# Patient Record
Sex: FEMALE | Race: WHITE | NOT HISPANIC OR LATINO | Employment: OTHER | URBAN - METROPOLITAN AREA
[De-identification: names, ages, dates, MRNs, and addresses within clinical notes are randomized per-mention and may not be internally consistent; named-entity substitution may affect disease eponyms.]

---

## 2018-01-17 NOTE — RESULT NOTES
Message   DW pt on 6/9/2016 OV  Verified Results  (1) CBC/PLT/DIFF [id number] 10:19AM [facility]     Test Name Result Flag Reference   WBC COUNT 6 51 Thousand/uL  4 31-10 16   RBC COUNT 4 41 Million/uL  3 81-5 12   HEMOGLOBIN 12 1 g/dL  11 5-15 4   HEMATOCRIT 38 7 %  34 8-46  1   MCV 88 fL  82-98   MCH 27 4 pg  26 8-34 3   MCHC 31 3 g/dL L 31 4-37 4   RDW 16 0 % H 11 6-15 1   MPV 11 6 fL  8 9-12 7   PLATELET COUNT 439 Thousands/uL  149-390   nRBC AUTOMATED 0 /100 WBCs     NEUTROPHILS RELATIVE PERCENT 62 %  43-75   LYMPHOCYTES RELATIVE PERCENT 28 %  14-44   MONOCYTES RELATIVE PERCENT 8 %  4-12   EOSINOPHILS RELATIVE PERCENT 2 %  0-6   BASOPHILS RELATIVE PERCENT 0 %  0-1   NEUTROPHILS ABSOLUTE COUNT 3 97 Thousands/?L  1 85-7 62   LYMPHOCYTES ABSOLUTE COUNT 1 85 Thousands/?L  0 60-4 47   MONOCYTES ABSOLUTE COUNT 0 53 Thousand/?L  0 17-1 22   EOSINOPHILS ABSOLUTE COUNT 0 12 Thousand/?L  0 00-0 61   BASOPHILS ABSOLUTE COUNT 0 02 Thousands/?L  0 00-0 10

## 2018-06-28 ENCOUNTER — HOSPITAL ENCOUNTER (OUTPATIENT)
Facility: HOSPITAL | Age: 83
Setting detail: OBSERVATION
Discharge: HOME | End: 2018-06-29
Attending: EMERGENCY MEDICINE | Admitting: INTERNAL MEDICINE
Payer: MEDICARE

## 2018-06-28 ENCOUNTER — APPOINTMENT (EMERGENCY)
Dept: RADIOLOGY | Facility: HOSPITAL | Age: 83
End: 2018-06-28
Payer: MEDICARE

## 2018-06-28 DIAGNOSIS — R82.81 STERILE PYURIA: Primary | ICD-10-CM
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
## 2018-06-28 DIAGNOSIS — S60.222A: ICD-10-CM

## 2018-06-28 DIAGNOSIS — S05.12XA TRAUMATIC ECCHYMOSIS OF LEFT ORBITAL RIM, INITIAL ENCOUNTER: ICD-10-CM

## 2018-06-28 PROBLEM — R26.2 AMBULATORY DYSFUNCTION: Status: ACTIVE | Noted: 2017-12-18

## 2018-06-28 PROBLEM — E55.9 VITAMIN D DEFICIENCY: Status: ACTIVE | Noted: 2018-01-03

## 2018-06-28 PROBLEM — I50.32 DIASTOLIC CHF, CHRONIC (HCC): Status: ACTIVE | Noted: 2017-12-18

## 2018-06-28 PROBLEM — J96.11 CHRONIC RESPIRATORY FAILURE WITH HYPOXIA (HCC): Chronic | Status: ACTIVE | Noted: 2018-06-28

## 2018-06-28 PROBLEM — R42 VERTIGO: Status: ACTIVE | Noted: 2018-06-28

## 2018-06-28 LAB
ALBUMIN SERPL BCP-MCNC: 3.6 G/DL (ref 3.5–5)
ALP SERPL-CCNC: 72 U/L (ref 46–116)
ALT SERPL W P-5'-P-CCNC: 18 U/L (ref 12–78)
ANION GAP SERPL CALCULATED.3IONS-SCNC: 8 MMOL/L (ref 4–13)
AST SERPL W P-5'-P-CCNC: 18 U/L (ref 5–45)
ATRIAL RATE: 104 BPM
BACTERIA UR QL AUTO: ABNORMAL /HPF
BASOPHILS # BLD AUTO: 0.03 THOUSANDS/ΜL (ref 0–0.1)
BASOPHILS NFR BLD AUTO: 0 % (ref 0–1)
BILIRUB SERPL-MCNC: 0.73 MG/DL (ref 0.2–1)
BILIRUB UR QL STRIP: ABNORMAL
BUN SERPL-MCNC: 14 MG/DL (ref 5–25)
CALCIUM SERPL-MCNC: 9.3 MG/DL (ref 8.3–10.1)
CHLORIDE SERPL-SCNC: 105 MMOL/L (ref 100–108)
CLARITY UR: CLEAR
CLARITY, POC: CLEAR
CO2 SERPL-SCNC: 28 MMOL/L (ref 21–32)
COLOR UR: YELLOW
COLOR, POC: YELLOW
CREAT SERPL-MCNC: 0.7 MG/DL (ref 0.6–1.3)
EOSINOPHIL # BLD AUTO: 0 THOUSAND/ΜL (ref 0–0.61)
EOSINOPHIL NFR BLD AUTO: 0 % (ref 0–6)
ERYTHROCYTE [DISTWIDTH] IN BLOOD BY AUTOMATED COUNT: 13.5 % (ref 11.6–15.1)
GFR SERPL CREATININE-BSD FRML MDRD: 76 ML/MIN/1.73SQ M
GLUCOSE SERPL-MCNC: 101 MG/DL (ref 65–140)
GLUCOSE UR STRIP-MCNC: NEGATIVE MG/DL
HCT VFR BLD AUTO: 40 % (ref 34.8–46.1)
HGB BLD-MCNC: 12.8 G/DL (ref 11.5–15.4)
HGB UR QL STRIP.AUTO: NEGATIVE
HYALINE CASTS #/AREA URNS LPF: ABNORMAL /LPF
IMM GRANULOCYTES # BLD AUTO: 0.04 THOUSAND/UL (ref 0–0.2)
IMM GRANULOCYTES NFR BLD AUTO: 1 % (ref 0–2)
KETONES UR STRIP-MCNC: ABNORMAL MG/DL
LEUKOCYTE ESTERASE UR QL STRIP: ABNORMAL
LYMPHOCYTES # BLD AUTO: 1.1 THOUSANDS/ΜL (ref 0.6–4.47)
LYMPHOCYTES NFR BLD AUTO: 15 % (ref 14–44)
MCH RBC QN AUTO: 31.7 PG (ref 26.8–34.3)
MCHC RBC AUTO-ENTMCNC: 32 G/DL (ref 31.4–37.4)
MCV RBC AUTO: 99 FL (ref 82–98)
MONOCYTES # BLD AUTO: 0.51 THOUSAND/ΜL (ref 0.17–1.22)
MONOCYTES NFR BLD AUTO: 7 % (ref 4–12)
NEUTROPHILS # BLD AUTO: 5.69 THOUSANDS/ΜL (ref 1.85–7.62)
NEUTS SEG NFR BLD AUTO: 77 % (ref 43–75)
NITRITE UR QL STRIP: NEGATIVE
NON-SQ EPI CELLS URNS QL MICRO: ABNORMAL /HPF
NRBC BLD AUTO-RTO: 0 /100 WBCS
NT-PROBNP SERPL-MCNC: 1252 PG/ML
P AXIS: 66 DEGREES
PH UR STRIP.AUTO: 7 [PH] (ref 4.5–8)
PLATELET # BLD AUTO: 248 THOUSANDS/UL (ref 149–390)
PMV BLD AUTO: 11.3 FL (ref 8.9–12.7)
POTASSIUM SERPL-SCNC: 3.6 MMOL/L (ref 3.5–5.3)
PR INTERVAL: 160 MS
PROT SERPL-MCNC: 7.1 G/DL (ref 6.4–8.2)
PROT UR STRIP-MCNC: ABNORMAL MG/DL
QRS AXIS: 94 DEGREES
QRSD INTERVAL: 104 MS
QT INTERVAL: 352 MS
QTC INTERVAL: 462 MS
RBC # BLD AUTO: 4.04 MILLION/UL (ref 3.81–5.12)
RBC #/AREA URNS AUTO: ABNORMAL /HPF
SODIUM SERPL-SCNC: 141 MMOL/L (ref 136–145)
SP GR UR STRIP.AUTO: 1.02 (ref 1–1.03)
T WAVE AXIS: 48 DEGREES
TROPONIN I SERPL-MCNC: <0.02 NG/ML
TSH SERPL DL<=0.05 MIU/L-ACNC: 1.41 UIU/ML (ref 0.36–3.74)
UROBILINOGEN UR QL STRIP.AUTO: 1 E.U./DL
VENTRICULAR RATE: 104 BPM
WBC # BLD AUTO: 7.37 THOUSAND/UL (ref 4.31–10.16)
WBC #/AREA URNS AUTO: ABNORMAL /HPF

## 2018-06-28 PROCEDURE — 36415 COLL VENOUS BLD VENIPUNCTURE: CPT | Performed by: STUDENT IN AN ORGANIZED HEALTH CARE EDUCATION/TRAINING PROGRAM

## 2018-06-28 PROCEDURE — 94760 N-INVAS EAR/PLS OXIMETRY 1: CPT

## 2018-06-28 PROCEDURE — 81001 URINALYSIS AUTO W/SCOPE: CPT

## 2018-06-28 PROCEDURE — 99220 PR INITIAL OBSERVATION CARE/DAY 70 MINUTES: CPT | Performed by: INTERNAL MEDICINE

## 2018-06-28 PROCEDURE — 99285 EMERGENCY DEPT VISIT HI MDM: CPT

## 2018-06-28 PROCEDURE — 70450 CT HEAD/BRAIN W/O DYE: CPT

## 2018-06-28 PROCEDURE — 93005 ELECTROCARDIOGRAM TRACING: CPT

## 2018-06-28 PROCEDURE — 80053 COMPREHEN METABOLIC PANEL: CPT | Performed by: STUDENT IN AN ORGANIZED HEALTH CARE EDUCATION/TRAINING PROGRAM

## 2018-06-28 PROCEDURE — 84443 ASSAY THYROID STIM HORMONE: CPT | Performed by: STUDENT IN AN ORGANIZED HEALTH CARE EDUCATION/TRAINING PROGRAM

## 2018-06-28 PROCEDURE — 73130 X-RAY EXAM OF HAND: CPT

## 2018-06-28 PROCEDURE — 87086 URINE CULTURE/COLONY COUNT: CPT

## 2018-06-28 PROCEDURE — 72125 CT NECK SPINE W/O DYE: CPT

## 2018-06-28 PROCEDURE — 83880 ASSAY OF NATRIURETIC PEPTIDE: CPT | Performed by: STUDENT IN AN ORGANIZED HEALTH CARE EDUCATION/TRAINING PROGRAM

## 2018-06-28 PROCEDURE — 84484 ASSAY OF TROPONIN QUANT: CPT | Performed by: STUDENT IN AN ORGANIZED HEALTH CARE EDUCATION/TRAINING PROGRAM

## 2018-06-28 PROCEDURE — 85025 COMPLETE CBC W/AUTO DIFF WBC: CPT | Performed by: STUDENT IN AN ORGANIZED HEALTH CARE EDUCATION/TRAINING PROGRAM

## 2018-06-28 RX ORDER — CEPHALEXIN 500 MG/1
500 CAPSULE ORAL EVERY 12 HOURS SCHEDULED
Qty: 6 CAPSULE | Refills: 0 | Status: SHIPPED | OUTPATIENT
Start: 2018-06-28 | End: 2018-06-29 | Stop reason: HOSPADM

## 2018-06-28 RX ORDER — ATORVASTATIN CALCIUM 10 MG/1
10 TABLET, FILM COATED ORAL DAILY
COMMUNITY

## 2018-06-28 RX ORDER — AMLODIPINE BESYLATE 5 MG/1
5 TABLET ORAL DAILY
COMMUNITY
End: 2022-01-01 | Stop reason: ALTCHOICE

## 2018-06-28 RX ORDER — MAGNESIUM HYDROXIDE/ALUMINUM HYDROXICE/SIMETHICONE 120; 1200; 1200 MG/30ML; MG/30ML; MG/30ML
15 SUSPENSION ORAL EVERY 6 HOURS PRN
Status: DISCONTINUED | OUTPATIENT
Start: 2018-06-28 | End: 2018-06-29 | Stop reason: HOSPADM

## 2018-06-28 RX ORDER — FERROUS SULFATE 325(65) MG
325 TABLET ORAL 2 TIMES WEEKLY
COMMUNITY
Start: 2022-01-01

## 2018-06-28 RX ORDER — POTASSIUM CHLORIDE 750 MG/1
10 TABLET, EXTENDED RELEASE ORAL DAILY
Status: DISCONTINUED | OUTPATIENT
Start: 2018-06-29 | End: 2018-06-29 | Stop reason: HOSPADM

## 2018-06-28 RX ORDER — MELATONIN
1000 DAILY
COMMUNITY

## 2018-06-28 RX ORDER — PANTOPRAZOLE SODIUM 40 MG/1
40 TABLET, DELAYED RELEASE ORAL
Status: DISCONTINUED | OUTPATIENT
Start: 2018-06-29 | End: 2018-06-29 | Stop reason: HOSPADM

## 2018-06-28 RX ORDER — ALBUTEROL SULFATE 90 UG/1
2 AEROSOL, METERED RESPIRATORY (INHALATION) EVERY 4 HOURS PRN
Status: DISCONTINUED | OUTPATIENT
Start: 2018-06-28 | End: 2018-06-29 | Stop reason: HOSPADM

## 2018-06-28 RX ORDER — ATORVASTATIN CALCIUM 10 MG/1
10 TABLET, FILM COATED ORAL DAILY
Status: DISCONTINUED | OUTPATIENT
Start: 2018-06-29 | End: 2018-06-29 | Stop reason: HOSPADM

## 2018-06-28 RX ORDER — FUROSEMIDE 20 MG/1
20 TABLET ORAL
Status: DISCONTINUED | OUTPATIENT
Start: 2018-06-28 | End: 2018-06-29 | Stop reason: HOSPADM

## 2018-06-28 RX ORDER — ACETAMINOPHEN 325 MG/1
650 TABLET ORAL EVERY 6 HOURS PRN
Status: DISCONTINUED | OUTPATIENT
Start: 2018-06-28 | End: 2018-06-29 | Stop reason: HOSPADM

## 2018-06-28 RX ORDER — ASPIRIN 81 MG/1
81 TABLET, CHEWABLE ORAL DAILY
COMMUNITY

## 2018-06-28 RX ORDER — ONDANSETRON 2 MG/ML
4 INJECTION INTRAMUSCULAR; INTRAVENOUS EVERY 6 HOURS PRN
Status: DISCONTINUED | OUTPATIENT
Start: 2018-06-28 | End: 2018-06-29 | Stop reason: HOSPADM

## 2018-06-28 RX ORDER — OMEPRAZOLE 20 MG/1
20 CAPSULE, DELAYED RELEASE ORAL 2 TIMES DAILY
COMMUNITY
End: 2022-01-01 | Stop reason: ALTCHOICE

## 2018-06-28 RX ORDER — ALBUTEROL SULFATE 90 UG/1
2 AEROSOL, METERED RESPIRATORY (INHALATION) EVERY 6 HOURS PRN
COMMUNITY
Start: 2022-01-01

## 2018-06-28 RX ORDER — ASPIRIN 81 MG/1
81 TABLET, CHEWABLE ORAL DAILY
Status: DISCONTINUED | OUTPATIENT
Start: 2018-06-29 | End: 2018-06-29 | Stop reason: HOSPADM

## 2018-06-28 RX ORDER — AMLODIPINE BESYLATE 5 MG/1
5 TABLET ORAL DAILY
Status: DISCONTINUED | OUTPATIENT
Start: 2018-06-29 | End: 2018-06-29 | Stop reason: HOSPADM

## 2018-06-28 RX ORDER — MELATONIN
1000 DAILY
Status: DISCONTINUED | OUTPATIENT
Start: 2018-06-29 | End: 2018-06-29 | Stop reason: HOSPADM

## 2018-06-28 RX ORDER — FUROSEMIDE 20 MG/1
20 TABLET ORAL 2 TIMES DAILY PRN
COMMUNITY
End: 2022-01-01 | Stop reason: ALTCHOICE

## 2018-06-28 RX ORDER — POTASSIUM CHLORIDE 750 MG/1
10 TABLET, FILM COATED, EXTENDED RELEASE ORAL DAILY
COMMUNITY

## 2018-06-28 RX ADMIN — FLUTICASONE PROPIONATE AND SALMETEROL 1 PUFF: 50; 250 POWDER RESPIRATORY (INHALATION) at 21:19

## 2018-06-28 RX ADMIN — FUROSEMIDE 20 MG: 20 TABLET ORAL at 18:04

## 2018-06-28 NOTE — H&P
INTERNAL MEDICINE HISTORY AND PHYSICAL  [facility] SOD Team A    NAME: Mercedes Worthington  AGE: 80 y o  SEX: female  : 1926   MRN: [id number]  ENCOUNTER: [id number]    DATE: 2018  TIME: 5:04 PM    Primary Care Physician: No primary care provider on file  Admitting Provider: Raymond Lawson MD    Chief complaint: Fall    History of Present Illness     Mercedes Worthington is a 80 y o  female with a past medical history of chronic diastolic CHF, hypertension, hyperlipidemia, GERD/gastritis, PVD s/p bilat iliac artery stents on 2016, COPD with chronic hypoxic respiratory failure on 2-3 L home O2, who presents for evaluation after and unwitnessed fall that occurred at home the patient was going to the bathroom  Patient states she felt dizziness like the room was spinning and just fell  She struck her head and notes left eye ecchymosis  Patient has markedly reduced hearing and unable to answer most questions accurately and frequently requires redirection and repetition  She is accompanied by her daughter at bedside who did not witness the fall  The patient was treated in the ED, CT head and neck were negative for acute findings and patient was just about to be discharged home when she got out of bed she felt dizzy like the room was spinning for couple seconds  Reports nausea  Currently she denies any dizziness lying flat in bed  Reports no symptoms of pain besides her left eye which has bruising  Denies any chest pain, shortness of breath, dysuria, urinary frequency, urinary incontinence, abdominal pain, vomiting  The patient lives alone in senior living facility, is independent with all her ADLs  Review of Systems   Review of Systems   Constitutional: Negative for chills and fever  HENT: Positive for hearing loss  Respiratory: Negative for cough, chest tightness and shortness of breath  Cardiovascular: Negative for chest pain and leg swelling  Gastrointestinal: Positive for nausea   Negative for abdominal pain  Genitourinary: Negative for difficulty urinating, dysuria and frequency  Musculoskeletal: Positive for gait problem  Negative for arthralgias and back pain  Skin: Negative for rash  Neurological: Positive for dizziness  Hematological: Negative  Does not bruise/bleed easily  Psychiatric/Behavioral: Negative  Negative for agitation  All other systems reviewed and are negative  Past Medical History     Past Medical History:   Diagnosis Date    CHF (congestive heart failure) (HCC)     GERD (gastroesophageal reflux disease)     Hyperlipidemia     Hypertension        Past Surgical History   History reviewed  No pertinent surgical history  Social History     History   Alcohol Use No     History   Drug Use No     History   Smoking Status    Former Smoker   Smokeless Tobacco    Never Used       Family History     Family History   Problem Relation Age of Onset    Heart attack Mother     Diabetes Mother     Stroke Father        Medications Prior to Admission     Prior to Admission medications    Medication Sig Start Date End Date Taking? Authorizing Provider   albuterol (PROVENTIL HFA,VENTOLIN HFA) 90 mcg/act inhaler Inhale 2 puffs as needed for wheezing   Yes Historical Provider, MD   amLODIPine (NORVASC) 5 mg tablet Take 5 mg by mouth daily   Yes Historical Provider, MD   aspirin 81 mg chewable tablet Chew 81 mg daily   Yes Historical Provider, MD   atorvastatin (LIPITOR) 10 mg tablet Take 10 mg by mouth daily   Yes Historical Provider, MD   cholecalciferol (VITAMIN D3) 1,000 units tablet Take 1,000 Units by mouth daily   Yes Historical Provider, MD   ferrous sulfate 325 (65 Fe) mg tablet Take 325 mg by mouth takes 2-3 times a week   Yes Historical Provider, MD   fluticasone-salmeterol (ADVAIR) 250-50 mcg/dose inhaler Inhale 1 puff 2 (two) times a day Rinse mouth after use     Yes Historical Provider, MD   furosemide (LASIX) 20 mg tablet Take 20 mg by mouth 2 (two) times a day as needed   Yes Historical Provider, MD   omeprazole (PriLOSEC) 20 mg delayed release capsule Take 20 mg by mouth 2 (two) times a day   Yes Historical Provider, MD   potassium chloride (K-DUR) 10 mEq tablet Take 10 mEq by mouth daily   Yes Historical Provider, MD   sertraline (ZOLOFT) 50 mg tablet Take 50 mg by mouth daily   Yes Historical Provider, MD   cephalexin (KEFLEX) 500 mg capsule Take 1 capsule (500 mg total) by mouth every 12 (twelve) hours for 3 days 6/28/18 7/1/18  Jakob Espinoza MD       Allergies   No Known Allergies    Objective     Vitals:    06/28/18 1430 06/28/18 1600 06/28/18 1630 06/28/18 1700   BP: 142/74 136/63 133/71 142/66   BP Location:       Pulse: 98 94 92 82   Resp: (!) 24 22 22 20   Temp:       TempSrc:       SpO2: 92% 92% 94% 96%   Weight:         Body mass index is 30 62 kg/m²  No intake or output data in the 24 hours ending 06/28/18 1704  Invasive Devices     Peripheral Intravenous Line            Peripheral IV 06/28/18 Left Antecubital less than 1 day                Physical Exam  GENERAL: Awake and alert Elderly female  No acute distress   HEENT: Left periorbital ecchymosis  No other signs of trauma  No scleral icterus  Pupils are 2 mm and reactive to light, equal  EOMI B/L  NECK: Neck supple with normal range of motion  Trachea midline  No JVD  CARDIOVASCULAR: S1 and S2 are present with 2/6 systolic ejection murmur  Regular rate and rhythm  RESPIRATORY: CTA B/L, no rales, rhonci or wheezes  Mildly tachypneic baseline, on 2 L nasal cannula  Symmetrical respiratory expansion  BREAST: Deferred  ABDOMINAL: Bowel sounds present, non-tender, soft, non-distended  EXTREMITIES:  no cyanosis,  edema  ROM intact  Upper extremities nontender to palpation  /GYN: Deferred  RECTAL: Deferred  BACK: Deferred   NEUROLOGIC: Alert and orientated to person and place, dizziness is reproduced with lateral eye gaze though no nystagmus is seen    Dizziness also reproduced with turning of head  All 4 extremities are with equal motor strength  No facial droop, mentation is appropriate and speech is clear   SKIN: Skin is warm and dry  Exam as above, no visible skin tear or abrasions    PSYCHIATRIC: Normal mood and affect     Lab Results: I have personally reviewed pertinent reports  CBC:   Results from last 7 days  Lab Units 06/28/18  1208   WBC Thousand/uL 7 37   RBC Million/uL 4 04   HEMOGLOBIN g/dL 12 8   HEMATOCRIT % 40 0   MCV fL 99*   MCH pg 31 7   MCHC g/dL 32 0   RDW % 13 5   MPV fL 11 3   PLATELETS Thousands/uL 248   NRBC AUTO /100 WBCs 0   NEUTROS PCT % 77*   LYMPHS PCT % 15   MONOS PCT % 7   EOS PCT % 0   BASOS PCT % 0   NEUTROS ABS Thousands/µL 5 69   LYMPHS ABS Thousands/µL 1 10   MONOS ABS Thousand/µL 0 51   EOS ABS Thousand/µL 0 00   , Chemistry Profile:   Results from last 7 days  Lab Units 06/28/18  1208   SODIUM mmol/L 141   POTASSIUM mmol/L 3 6   CHLORIDE mmol/L 105   CO2 mmol/L 28   ANION GAP mmol/L 8   BUN mg/dL 14   CREATININE mg/dL 0 70   GLUCOSE RANDOM mg/dL 101   CALCIUM mg/dL 9 3   AST U/L 18   ALT U/L 18   ALK PHOS U/L 72   TOTAL PROTEIN g/dL 7 1   BILIRUBIN TOTAL mg/dL 0 73   EGFR ml/min/1 73sq m 76   , Cardiac Studies:   Results from last 7 days  Lab Units 06/28/18  1208   TROPONIN I ng/mL <0 02       Imaging: I have personally reviewed pertinent films in PACS  Xr Hand 3+ Views Left    Result Date: 6/28/2018  Narrative: LEFT HAND INDICATION:   fall, L thumb ecchymosis  COMPARISON:  None VIEWS:  XR HAND 3+ VW LEFT For the purposes of institution wide universal language the following terms will apply: (thumb=1st digit/finger, index finger=2nd digit/finger, long finger=3rd digit/finger, ring=4th digit/finger and small finger=5th digit/finger) FINDINGS: There is no acute fracture or dislocation  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  1st carpometacarpal joint osteoarthritis is present  No lytic or blastic lesions are seen  Soft tissues are unremarkable  Impression: No acute osseous abnormality  Workstation performed: [id number]     Ct Head Without Contrast    Result Date: 6/28/2018  Narrative: CT BRAIN - WITHOUT CONTRAST INDICATION:   fall, L orbital hematoma  COMPARISON:  None  TECHNIQUE:  CT examination of the brain was performed  In addition to axial images, coronal 2D reformatted images were created and submitted for interpretation  Radiation dose length product (DLP) for this visit:  1069 mGy-cm   This examination, like all CT scans performed in the [facility], was performed utilizing techniques to minimize radiation dose exposure, including the use of iterative reconstruction and automated exposure control  IMAGE QUALITY:  Diagnostic  FINDINGS: PARENCHYMA:  No intracranial mass, mass effect or midline shift  No CT signs of acute infarction  No acute parenchymal hemorrhage  9 mm right periventricular white matter calcification 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 cavernoma  Periventricular and centrum semiovale white matter hypodensity is a nonspecific finding likely representing chronic microangiopathy  Calcification bilateral cavernous internal carotid and distal vertebral arteries  VENTRICLES AND EXTRA-AXIAL SPACES:  Normal for the patient's age  Small cavum septum pellucidum, a normal variant  VISUALIZED ORBITS AND PARANASAL SINUSES:  Changes of bilateral lens replacements noted  Trace mucosal thickening right sphenoid and ethmoid sinuses  CALVARIUM AND EXTRACRANIAL SOFT TISSUES:  Minimal chronic opacification bilateral inferior mastoids  Otherwise unremarkable  Right parotid gland mostly absent with small anterior accessory lobe  Correlate with prior surgical history  Impression: No acute intracranial process  No skull fracture  Chronic microangiopathy  Workstation performed: [id number]     Ct Cervical Spine Without Contrast    Result Date: 6/28/2018  Narrative: CT CERVICAL SPINE - WITHOUT CONTRAST INDICATION:   fall on ASA   COMPARISON: None  TECHNIQUE:  CT examination of the cervical spine was performed without intravenous contrast   Contiguous axial images were obtained  Sagittal and coronal reconstructions were performed  Radiation dose length product (DLP) for this visit:  276 mGy-cm   This examination, like all CT scans performed in the [facility], was performed utilizing techniques to minimize radiation dose exposure, including the use of iterative reconstruction and automated exposure control  IMAGE QUALITY:  Diagnostic  FINDINGS: ALIGNMENT:  There is straightening of normal cervical lordosis  No subluxation or compression deformity  VERTEBRAL BODIES:  No fracture  C5 spina bifida occulta defect  DEGENERATIVE CHANGES:  Moderate multilevel cervical degenerative changes are noted  No critical central canal stenosis  PREVERTEBRAL AND PARASPINAL SOFT TISSUES:  No prevertebral soft tissue swelling  Bilateral carotid artery calcification  THORACIC INLET:  Biapical emphysematous disease and pleural parenchymal scarring  Impression: No cervical spine fracture or traumatic malalignment  Moderate multilevel cervical degenerative changes  Workstation performed: [id number]       Echo from 12/2017  LEFT VENTRICLE    Normal left ventricular chamber size  Normal left ventricular systolic     function  Normal regional wall motion  Mild concentric left ventricular     hypertrophy  Estimated left ventricular ejection fraction is 70%  RIGHT VENTRICLE    Enlarged right ventricular size  Normal right ventricular systolic function  LEFT ATRIUM    Moderately dilated left atrium  RIGHT ATRIUM    Moderately dilated right atrium  AORTA    The aorta is not well visualized  PERICARDIUM    Normal pericardium without effusion  VEINS    Normal size inferior vena cava with normal inspiratory collapse consistent     with normal right atrial pressures (0-5mmhg)       EKG, Pathology, and Other Studies: I have personally reviewed pertinent reports  Medications given in Emergency Department       Assessment and Plan   Principal Problem:    Ambulatory dysfunction  Active Problems:    Diastolic CHF, chronic (HCC)    Essential hypertension    Chronic obstructive pulmonary disease (HCC)    Gastroesophageal reflux disease without esophagitis    Iron deficiency anemia    PVD (peripheral vascular disease) with claudication (HCC)    Chronic respiratory failure with hypoxia (HCC)    Vertigo        1  Ambulatory dysfunction  Likely secondary to vertigo versus orthostasis  Will check orthostatic vital signs  Admit for observation overnight  Likely DC in the morning if she is feeling better  Patient to ambulate only with assistance and to rise from bed slowly  At this time would forgo aggressive workup due to patient's advanced age and wishes for conservative care and to be at home as much as reasonable  2   Dizziness/vertigo  Unclear etiology though possible BPPV as elicited with head turning and lateral gaze  Will instruct to rise from bed slowly  Probable discharge tomorrow  3   Chronic diastolic CHF  Continue home regimen of Lasix and blood pressure control    4  Chronic respiratory failure with hypoxia on 2-3 L at baseline  Continue 2 L while lying in bed in the hospital   No further intervention    5  COPD not in acute exacerbation  Continue home fluticasone, albuterol inhaler p r n     6   GERD  Protonix 40 mg while in the hospital    7  Peripheral vascular disease  Continue aspirin, s/p stent placement     8  iron deficiency anemia  H/H normal, hold iron tabs as only 2-3x weekly  F/u outpatient       Code Status: Level 3 - DNAR/DNI  VTE Pharmacologic Prophylaxis: Reason for no pharmacologic prophylaxis short stay, recent Gi bleed   VTE Mechanical Prophylaxis: sequential compression device  Admission Status: OBSERVATION    Admission Time  I spent 1 hour admitting the patient    This involved direct patient contact where I performed a full history and physical, reviewing previous records, and reviewing laboratory and other diagnostic studies      Manfred Carney, DO  Internal Medicine  PGY-1

## 2018-06-28 NOTE — PROGRESS NOTES
[facility] Senior Admission Note   Unit/Bed # -01 Encounter: [id number]  SOD Team A          Maxine Iyer 80 y o  female [id number]       Patient seen and examined  Reviewed H&P per Dr Romero Filter  Agree with the assessment and plan      Assessment/Plan: Principal Problem:    Ambulatory dysfunction  Active Problems:    Diastolic CHF, chronic (HCC)    Essential hypertension    Chronic obstructive pulmonary disease (HCC)    Gastroesophageal reflux disease without esophagitis    Iron deficiency anemia    PVD (peripheral vascular disease) with claudication (HCC)    Chronic respiratory failure with hypoxia (HCC)    Vertigo         Disposition:  OBSERVATION    Expected LOS: <2 Shira Sanchez MD

## 2018-06-29 VITALS
RESPIRATION RATE: 18 BRPM | WEIGHT: 137.5 LBS | TEMPERATURE: 98.5 F | DIASTOLIC BLOOD PRESSURE: 70 MMHG | BODY MASS INDEX: 28.86 KG/M2 | SYSTOLIC BLOOD PRESSURE: 146 MMHG | HEART RATE: 98 BPM | HEIGHT: 58 IN | OXYGEN SATURATION: 92 %

## 2018-06-29 LAB
ANION GAP SERPL CALCULATED.3IONS-SCNC: 9 MMOL/L (ref 4–13)
BACTERIA UR CULT: NORMAL
BUN SERPL-MCNC: 14 MG/DL (ref 5–25)
CALCIUM SERPL-MCNC: 8.6 MG/DL (ref 8.3–10.1)
CHLORIDE SERPL-SCNC: 105 MMOL/L (ref 100–108)
CO2 SERPL-SCNC: 27 MMOL/L (ref 21–32)
CREAT SERPL-MCNC: 0.68 MG/DL (ref 0.6–1.3)
GFR SERPL CREATININE-BSD FRML MDRD: 77 ML/MIN/1.73SQ M
GLUCOSE SERPL-MCNC: 99 MG/DL (ref 65–140)
MAGNESIUM SERPL-MCNC: 1.8 MG/DL (ref 1.6–2.6)
POTASSIUM SERPL-SCNC: 3.3 MMOL/L (ref 3.5–5.3)
SODIUM SERPL-SCNC: 141 MMOL/L (ref 136–145)

## 2018-06-29 PROCEDURE — 80048 BASIC METABOLIC PNL TOTAL CA: CPT | Performed by: INTERNAL MEDICINE

## 2018-06-29 PROCEDURE — 83735 ASSAY OF MAGNESIUM: CPT | Performed by: INTERNAL MEDICINE

## 2018-06-29 PROCEDURE — 99217 PR OBSERVATION CARE DISCHARGE MANAGEMENT: CPT | Performed by: INTERNAL MEDICINE

## 2018-06-29 RX ORDER — POTASSIUM CHLORIDE 20 MEQ/1
40 TABLET, EXTENDED RELEASE ORAL ONCE
Status: COMPLETED | OUTPATIENT
Start: 2018-06-29 | End: 2018-06-29

## 2018-06-29 RX ADMIN — PANTOPRAZOLE SODIUM 40 MG: 40 TABLET, DELAYED RELEASE ORAL at 06:51

## 2018-06-29 RX ADMIN — FLUTICASONE PROPIONATE AND SALMETEROL 1 PUFF: 50; 250 POWDER RESPIRATORY (INHALATION) at 10:09

## 2018-06-29 RX ADMIN — FUROSEMIDE 20 MG: 20 TABLET ORAL at 10:08

## 2018-06-29 RX ADMIN — VITAMIN D, TAB 1000IU (100/BT) 1000 UNITS: 25 TAB at 10:07

## 2018-06-29 RX ADMIN — POTASSIUM CHLORIDE 40 MEQ: 1500 TABLET, EXTENDED RELEASE ORAL at 10:07

## 2018-06-29 RX ADMIN — POTASSIUM CHLORIDE 10 MEQ: 750 TABLET, EXTENDED RELEASE ORAL at 10:07

## 2018-06-29 RX ADMIN — AMLODIPINE BESYLATE 5 MG: 5 TABLET ORAL at 10:07

## 2018-06-29 RX ADMIN — ATORVASTATIN CALCIUM 10 MG: 10 TABLET, FILM COATED ORAL at 10:07

## 2018-06-29 RX ADMIN — ASPIRIN 81 MG 81 MG: 81 TABLET ORAL at 10:08

## 2018-06-29 RX ADMIN — SERTRALINE HYDROCHLORIDE 50 MG: 50 TABLET ORAL at 10:08

## 2018-06-29 NOTE — RESPIRATORY THERAPY NOTE
RT Protocol Note  Scottie[place]ah[place]kin Addy 80 y o  female MRN: [id number]  Unit/Bed#: -01 Encounter: [id number]    Assessment    Principal Problem:    Ambulatory dysfunction  Active Problems:    Diastolic CHF, chronic (HCC)    Essential hypertension    Chronic obstructive pulmonary disease (HCC)    Gastroesophageal reflux disease without esophagitis    Iron deficiency anemia    PVD (peripheral vascular disease) with claudication (HCC)    Chronic respiratory failure with hypoxia (HCC)    Vertigo      Home Pulmonary Medications:  advair bid, albuterol mdi prn  Home Devices/Therapy: Home O2    Past Medical History:   Diagnosis Date    CHF (congestive heart failure) (HCC)     GERD (gastroesophageal reflux disease)     Hyperlipidemia     Hypertension      Social History     Social History    Marital status:      Spouse name: N/A    Number of children: N/A    Years of education: N/A     Social History Main Topics    Smoking status: Former Smoker    Smokeless tobacco: Never Used    Alcohol use No    Drug use: No    Sexual activity: Not Asked     Other Topics Concern    None     Social History Narrative    None       Subjective         Objective    Physical Exam:   Assessment Type: Assess only  General Appearance: Alert, Awake  Respiratory Pattern: Spontaneous, Symmetrical, Normal  Chest Assessment: Trachea midline  Bilateral Breath Sounds: Clear  Cough: None  O2 Device: NC    Vitals:  Blood pressure (!) 193/84, pulse 91, temperature 98 2 °F (36 8 °C), temperature source Oral, resp  rate 20, height 4' 10" (1 473 m), weight 62 4 kg (137 lb 8 oz), SpO2 91 %  Imaging and other studies: I have personally reviewed pertinent reports  O2 Device: NC     Plan    Respiratory Plan: Home Bronchodilator Patient pathway, Discontinue Protocol        Resp Comments: Pt admit s/p fall  She has a history of copd and uses advair bid and albuterol mdi prn at home  Pt currently experiencing no sob or distress  States her breathing feels at baseline  Lungs are clear  Will continue albuterol mdi prn per patient home therapy  No other respiratory intervention required  Will DC per protocol

## 2018-06-29 NOTE — PROGRESS NOTES
IM Residency Progress Note   Unit/Bed#: -01 Encounter: [id number]  SOD Team A      Emil Jameson 80 y o  female [id number]    Hospital Stay Days: 0      Assessment/Plan:    Principal Problem:    Ambulatory dysfunction  Active Problems:    Diastolic CHF, chronic (HCC)    Essential hypertension    Chronic obstructive pulmonary disease (HCC)    Gastroesophageal reflux disease without esophagitis    Iron deficiency anemia    PVD (peripheral vascular disease) with claudication (HCC)    Chronic respiratory failure with hypoxia (HCC)    Vertigo    1  Ambulatory dysfunction  Likely secondary to vertigo, orthostatic vital signs negative     Probable DC this afternoon, patient's daughter is off work at 4:00 p m        2   Dizziness/vertigo suspect BPPV  Patient's dizziness is elicited by turning her head and lying on her left side, the area of injury  He also provoked during extraocular movements  Due to the extremely short duration of symptoms and quick resolution with resting the head unlikely any suspicion for dangerous etiology  Will ambulate patient see how she does today and then probable discharge  this afternoon  No events on telemetry       3  Chronic diastolic CHF  Continue home regimen of Lasix and blood pressure control  Potassium repletion given extra 40 mEq K-Dur tablet     4  Chronic respiratory failure with hypoxia on 2-3 L at baseline, stable  Continue 2 L while lying in bed in the hospital   No further intervention     5  COPD not in acute exacerbation  Continue home fluticasone, albuterol inhaler p r n      6    GERD  Protonix 40 mg while in the hospital     7   Peripheral vascular disease  Continue aspirin, s/p stent placement      8  iron deficiency anemia  H/H normal, hold iron tabs as only 2-3x weekly  F/u outpatient          Disposition: plan for dc this evening       Subjective:   Patient feeling well overnight, though she notes that when she lies on her left side of her head she feels the dizziness as this is the site of injury  Dizziness described as spinning and is brief, provoked and self-resolving  She denies any chest pain, worsening dyspnea, or abdominal pain  Does report mild nausea associated with the dizziness  Vitals: Temp (24hrs), Av 1 °F (36 7 °C), Min:97 4 °F (36 3 °C), Max:98 9 °F (37 2 °C)  Current: Temperature: 98 °F (36 7 °C)  Vitals:    18 1907 18 2041 18 2338 18 0741   BP: (!) 193/84  145/76 165/77   BP Location: Right arm  Right arm Left arm   Pulse:   97 92   Resp:   18 18   Temp:   98 9 °F (37 2 °C) 98 °F (36 7 °C)   TempSrc:   Oral Oral   SpO2:  91% 96% 92%   Weight:       Height:        Body mass index is 28 74 kg/m²  I/O last 24 hours: In: 180 [P O :180]  Out: -       Physical Exam:  General:  Awake and alert elderly female in no acute distress, well-appearing  Head:  Left periorbital ecchymosis noted, no other injury  ENT:  Mucous membranes moist, nasal cannula noted  Neck:  Supple, no JVD  Lungs:  Clear to auscultation bilaterally without significant wheeze, rhonchi or rales, nonlabored respirations  Cardiac:  Regular rate rhythm with a 2/6 systolic ejection murmur  Abdomen is soft and nontender  Neuro:  Dizziness provoked with lateral gaze to the left, no pathologic nystagmus present, no focal motor deficits, speech is clear and mentation appropriate    Skin is warm and dry, exam as above    Invasive Devices     Peripheral Intravenous Line            Peripheral IV 18 Left Antecubital 1 day                          Labs:   Recent Results (from the past 24 hour(s))   ECG 12 lead    Collection Time: 18 10:24 AM   Result Value Ref Range    Ventricular Rate 104 BPM    Atrial Rate 104 BPM    UT Interval 160 ms    QRSD Interval 104 ms    QT Interval 352 ms    QTC Interval 462 ms    P Axis 66 degrees    QRS Axis 94 degrees    T Wave Axis 48 degrees   CBC and differential    Collection Time: 18 12:08 PM   Result Value Ref Range    WBC 7 37 4 31 - 10 16 Thousand/uL    RBC 4 04 3 81 - 5 12 Million/uL    Hemoglobin 12 8 11 5 - 15 4 g/dL    Hematocrit 40 0 34 8 - 46 1 %    MCV 99 (H) 82 - 98 fL    MCH 31 7 26 8 - 34 3 pg    MCHC 32 0 31 4 - 37 4 g/dL    RDW 13 5 11 6 - 15 1 %    MPV 11 3 8 9 - 12 7 fL    Platelets 026 705 - 549 Thousands/uL    nRBC 0 /100 WBCs    Neutrophils Relative 77 (H) 43 - 75 %    Immat GRANS % 1 0 - 2 %    Lymphocytes Relative 15 14 - 44 %    Monocytes Relative 7 4 - 12 %    Eosinophils Relative 0 0 - 6 %    Basophils Relative 0 0 - 1 %    Neutrophils Absolute 5 69 1 85 - 7 62 Thousands/µL    Immature Grans Absolute 0 04 0 00 - 0 20 Thousand/uL    Lymphocytes Absolute 1 10 0 60 - 4 47 Thousands/µL    Monocytes Absolute 0 51 0 17 - 1 22 Thousand/µL    Eosinophils Absolute 0 00 0 00 - 0 61 Thousand/µL    Basophils Absolute 0 03 0 00 - 0 10 Thousands/µL   Comprehensive metabolic panel    Collection Time: 06/28/18 12:08 PM   Result Value Ref Range    Sodium 141 136 - 145 mmol/L    Potassium 3 6 3 5 - 5 3 mmol/L    Chloride 105 100 - 108 mmol/L    CO2 28 21 - 32 mmol/L    Anion Gap 8 4 - 13 mmol/L    BUN 14 5 - 25 mg/dL    Creatinine 0 70 0 60 - 1 30 mg/dL    Glucose 101 65 - 140 mg/dL    Calcium 9 3 8 3 - 10 1 mg/dL    AST 18 5 - 45 U/L    ALT 18 12 - 78 U/L    Alkaline Phosphatase 72 46 - 116 U/L    Total Protein 7 1 6 4 - 8 2 g/dL    Albumin 3 6 3 5 - 5 0 g/dL    Total Bilirubin 0 73 0 20 - 1 00 mg/dL    eGFR 76 ml/min/1 73sq m   TSH    Collection Time: 06/28/18 12:08 PM   Result Value Ref Range    TSH 3RD GENERATON 1 410 0 358 - 3 740 uIU/mL   Troponin I    Collection Time: 06/28/18 12:08 PM   Result Value Ref Range    Troponin I <0 02 <=0 04 ng/mL   B-type natriuretic peptide    Collection Time: 06/28/18 12:08 PM   Result Value Ref Range    NT-proBNP 1,252 (H) <450 pg/mL   POCT urinalysis dipstick    Collection Time: 06/28/18  1:10 PM   Result Value Ref Range    Color, UA yellow     Clarity, UA clear ED Urine Macroscopic    Collection Time: 06/28/18  1:18 PM   Result Value Ref Range    Color, UA Yellow     Clarity, UA Clear     pH, UA 7 0 4 5 - 8 0    Leukocytes, UA Large (A) Negative    Nitrite, UA Negative Negative    Protein,  (2+) (A) Negative mg/dl    Glucose, UA Negative Negative mg/dl    Ketones, UA 40 (2+) (A) Negative mg/dl    Urobilinogen, UA 1 0 0 2, 1 0 E U /dl E U /dl    Bilirubin, UA Interference- unable to analyze (A) Negative    Blood, UA Negative Negative    Specific Gravity, UA 1 020 1 003 - 1 030   Urine Microscopic    Collection Time: 06/28/18  1:18 PM   Result Value Ref Range    RBC, UA None Seen None Seen, 0-5 /hpf    WBC, UA 20-30 (A) None Seen, 0-5, 5-55, 5-65 /hpf    Epithelial Cells None Seen None Seen, Occasional /hpf    Bacteria, UA None Seen None Seen, Occasional /hpf    Hyaline Casts, UA None Seen None Seen /lpf   Basic metabolic panel    Collection Time: 06/29/18  5:56 AM   Result Value Ref Range    Sodium 141 136 - 145 mmol/L    Potassium 3 3 (L) 3 5 - 5 3 mmol/L    Chloride 105 100 - 108 mmol/L    CO2 27 21 - 32 mmol/L    Anion Gap 9 4 - 13 mmol/L    BUN 14 5 - 25 mg/dL    Creatinine 0 68 0 60 - 1 30 mg/dL    Glucose 99 65 - 140 mg/dL    Calcium 8 6 8 3 - 10 1 mg/dL    eGFR 77 ml/min/1 73sq m   Magnesium    Collection Time: 06/29/18  5:56 AM   Result Value Ref Range    Magnesium 1 8 1 6 - 2 6 mg/dL       Radiology Results: I have personally reviewed pertinent reports  Other Diagnostic Testing:   I have personally reviewed pertinent reports          Active Meds:   Current Facility-Administered Medications   Medication Dose Route Frequency    acetaminophen (TYLENOL) tablet 650 mg  650 mg Oral Q6H PRN    albuterol (PROVENTIL HFA,VENTOLIN HFA) inhaler 2 puff  2 puff Inhalation Q4H PRN    aluminum-magnesium hydroxide-simethicone (MYLANTA) 200-200-20 mg/5 mL oral suspension 15 mL  15 mL Oral Q6H PRN    amLODIPine (NORVASC) tablet 5 mg  5 mg Oral Daily    aspirin chewable tablet 81 mg  81 mg Oral Daily    atorvastatin (LIPITOR) tablet 10 mg  10 mg Oral Daily    cholecalciferol (VITAMIN D3) tablet 1,000 Units  1,000 Units Oral Daily    fluticasone-salmeterol (ADVAIR) 250-50 mcg/dose inhaler 1 puff  1 puff Inhalation Q12H [facility]    furosemide (LASIX) tablet 20 mg  20 mg Oral BID (diuretic)    ondansetron (ZOFRAN) injection 4 mg  4 mg Intravenous Q6H PRN    pantoprazole (PROTONIX) EC tablet 40 mg  40 mg Oral Early Morning    potassium chloride (K-DUR,KLOR-CON) CR tablet 10 mEq  10 mEq Oral Daily    potassium chloride (K-DUR,KLOR-CON) CR tablet 40 mEq  40 mEq Oral Once    sertraline (ZOLOFT) tablet 50 mg  50 mg Oral Daily         VTE Pharmacologic Prophylaxis: Sequential compression device (Venodyne)   VTE Mechanical Prophylaxis: sequential compression device    Lazarus Gang, DO

## 2018-06-29 NOTE — ED PROVIDER NOTES
History  Chief Complaint   Patient presents with    Fall     Pt was in the [facility] when she became dizzy  Pt fell and struck her walker on the way down  Noted contusion to the L eye  Pt denies head/neck pain  No LOC  This is a 80-year-old female with a past medical history diastolic heart failure presents to the emergency department after a fall at home was unwitnessed  Patient states that she was going to the bathroom when she stood up from the toilet she started to feel dizzy as if the room is spinning around her  She reached out to grab her walker and but the brakes on but even with doing so she fell forward and she believes she struck her face on her walker  Patient denies any loss of consciousness  Patient also denying any headaches at this time and states the only thing that is bothering her is pain around the left orbit  She denies any palpitations prior to fall  She has not started or stopped any medications recently and she is only on a baby aspirin daily terms any anticoagulation or anti-platelet medications  Prior to Admission Medications   Prescriptions Last Dose Informant Patient Reported? Taking? albuterol (PROVENTIL HFA,VENTOLIN HFA) 90 mcg/act inhaler   Yes Yes   Sig: Inhale 2 puffs as needed for wheezing   amLODIPine (NORVASC) 5 mg tablet   Yes Yes   Sig: Take 5 mg by mouth daily   aspirin 81 mg chewable tablet   Yes Yes   Sig: Chew 81 mg daily   atorvastatin (LIPITOR) 10 mg tablet   Yes Yes   Sig: Take 10 mg by mouth daily   cholecalciferol (VITAMIN D3) 1,000 units tablet   Yes Yes   Sig: Take 1,000 Units by mouth daily   ferrous sulfate 325 (65 Fe) mg tablet   Yes Yes   Sig: Take 325 mg by mouth takes 2-3 times a week   fluticasone-salmeterol (ADVAIR) 250-50 mcg/dose inhaler   Yes Yes   Sig: Inhale 1 puff 2 (two) times a day Rinse mouth after use     furosemide (LASIX) 20 mg tablet   Yes Yes   Sig: Take 20 mg by mouth 2 (two) times a day as needed   omeprazole (PriLOSEC) 20 mg delayed release capsule   Yes Yes   Sig: Take 20 mg by mouth 2 (two) times a day   potassium chloride (K-DUR) 10 mEq tablet   Yes Yes   Sig: Take 10 mEq by mouth daily   sertraline (ZOLOFT) 50 mg tablet   Yes Yes   Sig: Take 50 mg by mouth daily      Facility-Administered Medications: None       Past Medical History:   Diagnosis Date    CHF (congestive heart failure) (HCC)     GERD (gastroesophageal reflux disease)     Hyperlipidemia     Hypertension        History reviewed  No pertinent surgical history  Family History   Problem Relation Age of Onset    Heart attack Mother     Diabetes Mother     Stroke Father      I have reviewed and agree with the history as documented  Social History   Substance Use Topics    Smoking status: Former Smoker    Smokeless tobacco: Never Used    Alcohol use No        Review of Systems   Constitutional: Negative for chills, fatigue and fever  HENT: Negative for congestion, rhinorrhea, sinus pressure and sore throat  Eyes: Negative for visual disturbance  Respiratory: Negative for cough and shortness of breath  Cardiovascular: Negative for chest pain  Gastrointestinal: Negative for abdominal pain, constipation, diarrhea, nausea and vomiting  Genitourinary: Negative for dysuria, frequency, hematuria and urgency  Musculoskeletal: Negative for arthralgias and myalgias  Skin: Negative for color change and rash  Neurological: Positive for dizziness and light-headedness  Negative for tremors, syncope, weakness, numbness and headaches         Physical Exam  ED Triage Vitals   Temperature Pulse Respirations Blood Pressure SpO2   06/28/18 1019 06/28/18 1019 06/28/18 1019 06/28/18 1019 06/28/18 1019   (!) 97 4 °F (36 3 °C) (!) 114 (!) 24 (!) 214/97 (!) 88 %      Temp Source Heart Rate Source Patient Position - Orthostatic VS BP Location FiO2 (%)   06/28/18 1019 06/28/18 1905 06/28/18 1019 06/28/18 1019 --   Oral Monitor Lying Right arm       Pain Score 06/28/18 1019       No Pain           Orthostatic Vital Signs  Vitals:    06/28/18 1907 06/28/18 2338 06/29/18 0741 06/29/18 1500   BP: (!) 193/84 145/76 165/77 146/70   Pulse:  97 92 98   Patient Position - Orthostatic VS: Standing - Orthostatic VS Lying Lying - Orthostatic VS Lying       Physical Exam   Constitutional: She is oriented to person, place, and time  She appears well-developed and well-nourished  No distress  HENT:   Head: Normocephalic and atraumatic  Eyes: Conjunctivae are normal  Pupils are equal, round, and reactive to light  Cardiovascular: Regular rhythm and normal heart sounds  Exam reveals no gallop and no friction rub  No murmur heard  Pulmonary/Chest: Breath sounds normal  No respiratory distress  She has no wheezes  She has no rales  Abdominal: Soft  Bowel sounds are normal  She exhibits no distension  There is no tenderness  There is no guarding  Musculoskeletal: Normal range of motion  She exhibits no edema, tenderness or deformity  Neurological: She is alert and oriented to person, place, and time  No cranial nerve deficit or sensory deficit  She exhibits normal muscle tone  Skin: Skin is warm and dry  No rash noted  She is not diaphoretic  No erythema  No pallor  Ecchymosis surrounding patient's left orbit   Psychiatric: She has a normal mood and affect  Her behavior is normal    Nursing note and vitals reviewed        ED Medications  Medications   potassium chloride (K-DUR,KLOR-CON) CR tablet 40 mEq (40 mEq Oral Given 6/29/18 1007)       Diagnostic Studies  Results Reviewed     Procedure Component Value Units Date/Time    Urine culture [[id number]] Collected:  06/28/18 1318    Lab Status:  Final result Specimen:  Urine from Urine, Clean Catch Updated:  06/29/18 1238     Urine Culture 40,000-49,000 cfu/ml     Urine Microscopic [[id number]]  (Abnormal) Collected:  06/28/18 1318    Lab Status:  Final result Specimen:  Urine from Urine, Clean Catch Updated:  06/28/18 5825 RBC, UA None Seen /hpf      WBC, UA 20-30 (A) /hpf      Epithelial Cells None Seen /hpf      Bacteria, UA None Seen /hpf      Hyaline Casts, UA None Seen /lpf     POCT urinalysis dipstick [[id number]]  (Normal) Resulted:  06/28/18 1310    Lab Status:  Final result Specimen:  Urine Updated:  06/28/18 1311     Color, UA yellow     Clarity, UA clear    ED Urine Macroscopic [[id number]]  (Abnormal) Collected:  06/28/18 1318    Lab Status:  Final result Specimen:  Urine Updated:  06/28/18 1310     Color, UA Yellow     Clarity, UA Clear     pH, UA 7 0     Leukocytes, UA Large (A)     Nitrite, UA Negative     Protein,  (2+) (A) mg/dl      Glucose, UA Negative mg/dl      Ketones, UA 40 (2+) (A) mg/dl      Urobilinogen, UA 1 0 E U /dl      Bilirubin, UA Interference- unable to analyze (A)     Blood, UA Negative     Specific Gravity, UA 1 020    Narrative:       CLINITEK RESULT    Comprehensive metabolic panel [[id number]] Collected:  06/28/18 1208    Lab Status:  Final result Specimen:  Blood from Arm, Left Updated:  06/28/18 1253     Sodium 141 mmol/L      Potassium 3 6 mmol/L      Chloride 105 mmol/L      CO2 28 mmol/L      Anion Gap 8 mmol/L      BUN 14 mg/dL      Creatinine 0 70 mg/dL      Glucose 101 mg/dL      Calcium 9 3 mg/dL      AST 18 U/L      ALT 18 U/L      Alkaline Phosphatase 72 U/L      Total Protein 7 1 g/dL      Albumin 3 6 g/dL      Total Bilirubin 0 73 mg/dL      eGFR 76 ml/min/1 73sq m     Narrative:         National Kidney Disease Education Program recommendations are as follows:  GFR calculation is accurate only with a steady state creatinine  Chronic Kidney disease less than 60 ml/min/1 73 sq  meters  Kidney failure less than 15 ml/min/1 73 sq  meters      TSH [[id number]]  (Normal) Collected:  06/28/18 1208    Lab Status:  Final result Specimen:  Blood from Arm, Left Updated:  06/28/18 1253     TSH 3RD GENERATON 1 410 uIU/mL     Narrative:         Patients undergoing fluorescein dye angiography may retain small amounts of fluorescein in the body for 48-72 hours post procedure  Samples containing fluorescein can produce falsely depressed TSH values  If the patient had this procedure,a specimen should be resubmitted post fluorescein clearance  The recommended reference ranges for TSH during pregnancy are as follows:  First trimester 0 1 to 2 5 uIU/mL  Second trimester  0 2 to 3 0 uIU/mL  Third trimester 0 3 to 3 0 uIU/m      B-type natriuretic peptide [[id number]]  (Abnormal) Collected:  06/28/18 1208    Lab Status:  Final result Specimen:  Blood from Arm, Left Updated:  06/28/18 1253     NT-proBNP 1,252 (H) pg/mL     Troponin I [[id number]]  (Normal) Collected:  06/28/18 1208    Lab Status:  Final result Specimen:  Blood from Arm, Left Updated:  06/28/18 1244     Troponin I <0 02 ng/mL     CBC and differential [[id number]]  (Abnormal) Collected:  06/28/18 1208    Lab Status:  Final result Specimen:  Blood from Arm, Left Updated:  06/28/18 1227     WBC 7 37 Thousand/uL      RBC 4 04 Million/uL      Hemoglobin 12 8 g/dL      Hematocrit 40 0 %      MCV 99 (H) fL      MCH 31 7 pg      MCHC 32 0 g/dL      RDW 13 5 %      MPV 11 3 fL      Platelets 307 Thousands/uL      nRBC 0 /100 WBCs      Neutrophils Relative 77 (H) %      Immat GRANS % 1 %      Lymphocytes Relative 15 %      Monocytes Relative 7 %      Eosinophils Relative 0 %      Basophils Relative 0 %      Neutrophils Absolute 5 69 Thousands/µL      Immature Grans Absolute 0 04 Thousand/uL      Lymphocytes Absolute 1 10 Thousands/µL      Monocytes Absolute 0 51 Thousand/µL      Eosinophils Absolute 0 00 Thousand/µL      Basophils Absolute 0 03 Thousands/µL                  XR hand 3+ views LEFT   Final Result by Luciana Braga MD (06/28 1307)      No acute osseous abnormality              Workstation performed: [id number]         CT cervical spine without contrast   Final Result by Juan Sampson MD (06/28 1229)      No cervical spine fracture or traumatic malalignment  Moderate multilevel cervical degenerative changes  Workstation performed: [id number]         CT head without contrast   Final Result by Maxine Mcnair MD (06/28 1224)      No acute intracranial process  No skull fracture  Chronic microangiopathy  Workstation performed: [id number]               Procedures  ECG 12 Lead Documentation  Date/Time: 6/30/2018 11:28 PM  Performed by: Eleazar Us  Authorized by: Eleazar Us     Indications / Diagnosis:  Tachycardic  ECG reviewed by me, the ED Provider: yes    Interpretation:     Interpretation: abnormal    Rate:     ECG rate assessment: tachycardic    Rhythm:     Rhythm: sinus rhythm    Ectopy:     Ectopy: none    QRS:     QRS axis:  Normal    QRS intervals:  Normal  Conduction:     Conduction: abnormal      Abnormal conduction: incomplete RBBB    ST segments:     ST segments:  Normal  T waves:     T waves: normal              Phone Consults  ED Phone Contact    ED Course           Identification of Seniors at Risk      Most Recent Value   (ISAR) Identification of Seniors at Risk   Before the illness or injury that brought you to the Emergency, did you need someone to help you on a regular basis? 0 Filed at: 06/28/2018 1024   In the last 24 hours, have you needed more help than usual?  0 Filed at: 06/28/2018 1024   Have you been hospitalized for one or more nights during the past 6 months? 1 Filed at: 06/28/2018 1024   In general, do you see well?  0 Filed at: 06/28/2018 1024   In general, do you have serious problems with your memory? 0 Filed at: 06/28/2018 1024   Do you take more than three different medications every day?   1 Filed at: 06/28/2018 1024   ISAR Score  2 Filed at: 06/28/2018 1024                          MDM  Number of Diagnoses or Management Options  Fall, initial encounter:   Sterile pyuria:   Traumatic ecchymosis of hand, left, initial encounter: Traumatic ecchymosis of left orbital rim, initial encounter:   Diagnosis management comments: Patient states that she was feeling better after lying down in the bed for a prolonged period here in the ER  Labs and images were unremarkable  However, when the patient was stood up from the bed to try and walk around the room, she states that she became very dizzy and would have fallen had we not been holding onto her    Called and spoke SOD who agreed to admit the patient to their service  CritCare Time    Disposition  Final diagnoses:   Sterile pyuria   Fall, initial encounter   Traumatic ecchymosis of left orbital rim, initial encounter   Traumatic ecchymosis of hand, left, initial encounter     Time reflects when diagnosis was documented in both MDM as applicable and the Disposition within this note     Time User Action Codes Description Comment    6/28/2018  2:40 PM Terence Silver Add [N39 0] Sterile pyuria     6/28/2018  2:41 PM Kathlyne Bride Add Henry Camel  DHUD] Fall, initial encounter     6/28/2018  2:41 PM Kathlyne Bride Add [Z18 [id number]] Traumatic ecchymosis of left orbital rim, initial encounter     6/28/2018  2:41 PM Kathlyne Bride Add [X32 [id number]] Traumatic ecchymosis of hand, left, initial encounter       ED Disposition     ED Disposition Condition Comment    Admit  Case was discussed with SOD and the patient's admission status was agreed to be Admission Status: observation status to the service of Dr David Thomson           Follow-up Information     Follow up With Specialties Details Why Contact Info    [employer]  Follow up  [phone]      Rico Blount MD Family Medicine Call in 2 week(s) Follow up with your PCP  [address] [place] [id number] [phone]            Discharge Medication List as of 6/29/2018  3:26 PM      START taking these medications    Details   cephalexin (KEFLEX) 500 mg capsule Take 1 capsule (500 mg total) by mouth every 12 (twelve) hours for 3 days, Starting Thu 6/28/2018, Until Sun 7/1/2018, Print         CONTINUE these medications which have NOT CHANGED    Details   albuterol (PROVENTIL HFA,VENTOLIN HFA) 90 mcg/act inhaler Inhale 2 puffs as needed for wheezing, Historical Med      amLODIPine (NORVASC) 5 mg tablet Take 5 mg by mouth daily, Historical Med      aspirin 81 mg chewable tablet Chew 81 mg daily, Historical Med      atorvastatin (LIPITOR) 10 mg tablet Take 10 mg by mouth daily, Historical Med      cholecalciferol (VITAMIN D3) 1,000 units tablet Take 1,000 Units by mouth daily, Historical Med      ferrous sulfate 325 (65 Fe) mg tablet Take 325 mg by mouth takes 2-3 times a week, Historical Med      fluticasone-salmeterol (ADVAIR) 250-50 mcg/dose inhaler Inhale 1 puff 2 (two) times a day Rinse mouth after use , Historical Med      furosemide (LASIX) 20 mg tablet Take 20 mg by mouth 2 (two) times a day as needed, Historical Med      omeprazole (PriLOSEC) 20 mg delayed release capsule Take 20 mg by mouth 2 (two) times a day, Historical Med      potassium chloride (K-DUR) 10 mEq tablet Take 10 mEq by mouth daily, Historical Med      sertraline (ZOLOFT) 50 mg tablet Take 50 mg by mouth daily, Historical Med             Outpatient Discharge Orders  Discharge Diet     No strenuous exercise     No dressing needed         ED Provider  Attending physically available and evaluated Odell Balderas I managed the patient along with the ED Attending      Electronically Signed by         Mejia Sharpe MD  06/30/18 8465       Mejia Sharpe MD  07/01/18 0010

## 2018-06-29 NOTE — CASE MANAGEMENT
Initial Clinical Review    Admission: Date/Time/Statement: 6/28/18 AT 1606 OBSERVATION    Orders Placed This Encounter   Procedures    Place in Observation (expected length of stay for this patient is less than two midnights)     Standing Status:   Standing     Number of Occurrences:   1     Order Specific Question:   Admitting Physician     Answer:   BRIAN SQUIRES [[id number]]     Order Specific Question:   Level of Care     Answer:   Med Surg [[id number]]       ED: Date/Time/Mode of Arrival:   ED Arrival Information     Expected Arrival Acuity Means of Arrival Escorted By Service Admission Type    - 6/28/2018 10:15 Urgent Ambulance [facility] ED of [address] [facility] Urgent    Arrival Complaint    Fall          Chief Complaint:   Chief Complaint   Patient presents with    Fall     Pt was in the [facility] when she became dizzy  Pt fell and struck her walker on the way down  Noted contusion to the L eye  Pt denies head/neck pain  No LOC  Chief complaint:   FALL       History of Present Illness      Theodore Fitzgerald is a 80 y o  female with a past medical history of chronic diastolic CHF, hypertension, hyperlipidemia, GERD/gastritis, PVD s/p bilat iliac artery stents on 11/2016, COPD with chronic hypoxic respiratory failure on 2-3 L home O2, who presents for evaluation after and unwitnessed fall that occurred at home the patient was going to the bathroom  Patient states she felt dizziness like the room was spinning and just fell  She struck her head and notes left eye ecchymosis  Patient has markedly reduced hearing and unable to answer most questions accurately and frequently requires redirection and repetition  She is accompanied by her daughter at bedside who did not witness the fall  The patient was treated in the ED, CT head and neck were negative for acute findings and patient was just about to be discharged home when she got out of bed she felt dizzy like the room was spinning for couple seconds  Reports nausea  Currently she denies any dizziness lying flat in bed  Reports no symptoms of pain besides her left eye which has bruising  Denies any chest pain, shortness of breath, dysuria, urinary frequency, urinary incontinence, abdominal pain, vomiting  The patient lives alone in senior living facility, is independent with all her ADLs     Review of Systems     Constitutional: Negative for chills and fever  HENT: Positive for hearing loss  Respiratory: Negative for cough, chest tightness and shortness of breath  Cardiovascular: Negative for chest pain and leg swelling  Gastrointestinal: Positive for nausea  Negative for abdominal pain  Genitourinary: Negative for difficulty urinating, dysuria and frequency  Musculoskeletal: Positive for gait problem  Negative for arthralgias and back pain  Skin: Negative for rash  Neurological: Positive for dizziness  Psychiatric/Behavioral: Negative  Negative for agitation  Physical Exam  GENERAL: Awake and alert Elderly female  No acute distress   HEENT: Left periorbital ecchymosis  No other signs of trauma  No scleral icterus  Pupils are 2 mm and reactive to light, equal  EOMI B/L  NECK: Neck supple with normal range of motion  Trachea midline  No JVD  CARDIOVASCULAR: S1 and S2 are present with 2/6 systolic ejection murmur  Regular rate and rhythm  RESPIRATORY: CTA B/L, no rales, rhonci or wheezes  Mildly tachypneic baseline, on 2 L nasal cannula  Symmetrical respiratory expansion  BREAST: Deferred  ABDOMINAL: Bowel sounds present, non-tender, soft, non-distended  EXTREMITIES:  no cyanosis,  edema  ROM intact  Upper extremities nontender to palpation  /GYN: Deferred  RECTAL: Deferred  BACK: Deferred   NEUROLOGIC: Alert and orientated to person and place, dizziness is reproduced with lateral eye gaze though no nystagmus is seen  Dizziness also reproduced with turning of head  All 4 extremities are with equal motor strength    No facial droop, mentation is appropriate and speech is clear       ED Vital Signs:   ED Triage Vitals   Temperature Pulse Respirations Blood Pressure SpO2   06/28/18 1019 06/28/18 1019 06/28/18 1019 06/28/18 1019 06/28/18 1019   (!) 97 4 °F (36 3 °C) (!) 114 (!) 24 (!) 214/97 (!) 88 %      Temp Source Heart Rate Source Patient Position - Orthostatic VS BP Location FiO2 (%)   06/28/18 1019 06/28/18 1905 06/28/18 1019 06/28/18 1019 --   Oral Monitor Lying Right arm       Pain Score       06/28/18 1019       No Pain        Wt Readings from Last 1 Encounters:   06/28/18 62 4 kg (137 lb 8 oz)      06/28 0701  06/29 0700 06/29 0701  06/29 1039  Most Recent    Temperature (°F) 97 498 9 98  98 (36 7)    Pulse 82114 92  92    Respirations 1828 18  18    Blood Pressure 133/71214/97 165/77  165/77    SpO2 (%) 8896 92  92        LABS/Diagnostic Test Results:   CBC  Results from last 7 days  Lab Units 06/28/18  1208   WBC Thousand/uL 7 37   RBC Million/uL 4 04   HEMOGLOBIN g/dL 12 8   HEMATOCRIT % 40 0   MCV fL 99*   MCH pg 31 7   MCHC g/dL 32 0   RDW % 13 5   MPV fL 11 3   PLATELETS Thousands/uL 248   NRBC AUTO /100 WBCs 0   NEUTROS PCT % 77*   LYMPHS PCT % 15   MONOS PCT % 7   EOS PCT % 0   BASOS PCT % 0   NEUTROS ABS Thousands/µL 5 69   LYMPHS ABS Thousands/µL 1 10   MONOS ABS Thousand/µL 0 51   EOS ABS Thousand/µL 0 00     CMP  Results from last 7 days  Lab Units 06/28/18  1208   SODIUM mmol/L 141   POTASSIUM mmol/L 3 6   CHLORIDE mmol/L 105   CO2 mmol/L 28   ANION GAP mmol/L 8   BUN mg/dL 14   CREATININE mg/dL 0 70   GLUCOSE RANDOM mg/dL 101   CALCIUM mg/dL 9 3   AST U/L 18   ALT U/L 18   ALK PHOS U/L 72   TOTAL PROTEIN g/dL 7 1   BILIRUBIN TOTAL mg/dL 0 73   EGFR ml/min/1 73sq m 76     Lab Units 06/28/18  1208   TROPONIN I ng/mL <0 02     B-type natriuretic peptide [[id number]] (Abnormal) Collected: 06/28/18 1208   Lab Status: Final result Specimen: Blood from Arm, Left Updated: 06/28/18 1253    NT-proBNP 1,252 (H) <450 pg/mL      ED Urine Macroscopic [[id number]] (Abnormal) Collected: 06/28/18 1318   Lab Status: Final result Specimen: Urine Updated: 06/28/18 1310    Color, UA Yellow    Clarity, UA Clear    pH, UA 7 0 4 5 - 8 0     Leukocytes, UA Large (A) Negative     Nitrite, UA Negative Negative     Protein,  (2+) (A) Negative mg/dl     Glucose, UA Negative Negative mg/dl     Ketones, UA 40 (2+) (A) Negative mg/dl     Urobilinogen, UA 1 0 0 2, 1 0 E U /dl E U /dl     Bilirubin, UA      Interference- unable to analyze (A)    Ref Range: Negative            Blood, UA Negative Negative     Specific Gravity, UA 1 020 1 003 - 1 030          Urine Microscopic [[id number]] (Abnormal) Collected: 06/28/18 1318   Lab Status: Final result Specimen: Urine from Urine, Clean Catch Updated: 06/28/18 1335    RBC, UA None Seen None Seen, 0-5 /hpf     WBC, UA 20-30 (A) None Seen, 0-5, 5-55, 5-65 /hpf     Epithelial Cells None Seen None Seen, Occasional /hpf     Bacteria, UA None Seen None Seen, Occasional /hpf     Hyaline Casts, UA None Seen None Seen /lpf    Urine culture [[id number]] Collected: 06/28/18 1318   Lab Status: In process Specimen: Urine from Urine, Clean Catch Updated: 06/28/18 1335       CT HEAD -  No acute intracranial process  No skull fracture  Chronic microangiopathy        CT C SPINE -  No cervical spine fracture or traumatic malalignment  Moderate multilevel cervical degenerative changes      ED Treatment:   Medication Administration from 06/28/2018 1015 to 06/28/2018 1710     None          Past Medical/Surgical History:    Active Ambulatory Problems     Diagnosis Date Noted    Vitamin D deficiency 01/03/2018     Resolved Ambulatory Problems     Diagnosis Date Noted    No Resolved Ambulatory Problems     Past Medical History:   Diagnosis Date    CHF (congestive heart failure) (HCC)     GERD (gastroesophageal reflux disease)     Hyperlipidemia     Hypertension        Admitting Diagnosis: Eye injury [S05 90XA]  Sterile pyuria [N39 0]  Fall, initial encounter [W19  XXXA]  Traumatic ecchymosis of hand, left, initial encounter [S60 222A]  Traumatic ecchymosis of left orbital rim, initial encounter [S05 12XA]    Age/Sex: 80 y o  female      Assessment and Plan     Principal Problem:    Ambulatory dysfunction    Active Problems:    Diastolic CHF, chronic (HCC)    Essential hypertension    Chronic obstructive pulmonary disease (HCC)    Gastroesophageal reflux disease without esophagitis    Iron deficiency anemia    PVD (peripheral vascular disease) with claudication (HCC)    Chronic respiratory failure with hypoxia (HCC)    Vertigo      1  Ambulatory dysfunction  Likely secondary to vertigo versus orthostasis  Will check orthostatic vital signs  Admit for observation overnight  Likely DC in the morning if she is feeling better  Patient to ambulate only with assistance and to rise from bed slowly  At this time would forgo aggressive workup due to patient's advanced age and wishes for conservative care and to be at home as much as reasonable      2   Dizziness/vertigo  Unclear etiology though possible BPPV as elicited with head turning and lateral gaze  Will instruct to rise from bed slowly  Probable discharge tomorrow      3  Chronic diastolic CHF  Continue home regimen of Lasix and blood pressure control     4  Chronic respiratory failure with hypoxia on 2-3 L at baseline  Continue 2 L while lying in bed in the hospital   No further intervention     5  COPD not in acute exacerbation  Continue home fluticasone, albuterol inhaler p r n      6    GERD  Protonix 40 mg while in the hospital     7   Peripheral vascular disease  Continue aspirin, s/p stent placement      8  iron deficiency anemia  H/H normal, hold iron tabs as only 2-3x weekly  F/u outpatient       Code Status: Level 3 - DNAR/DNI    VTE Pharmacologic Prophylaxis: Reason for no pharmacologic prophylaxis short stay, recent Gi bleed   VTE Mechanical Prophylaxis: sequential compression device    Admission Status: OBSERVATION         Admission Orders:  6/28/18 AT 1606 OBSERVATION  VS Q4HRS    Up with Assistance  SCD    Diet Cardiac; Sodium 2 GM; Cardiac Step 1, Fluid Restriction 1500 ML       IV ACCESS      Scheduled Meds:   Current Facility-Administered Medications:  acetaminophen 650 mg Oral Q6H PRN Jourdan Durham, DO   albuterol 2 puff Inhalation Q4H PRN Jourdan Durham, DO   aluminum-magnesium hydroxide-simethicone 15 mL Oral Q6H PRN Jourdan Durham, DO   amLODIPine 5 mg Oral Daily Jourdan Durham, DO   aspirin 81 mg Oral Daily Jourdan Durham, DO   atorvastatin 10 mg Oral Daily Jourdan Durham, DO   cholecalciferol 1,000 Units Oral Daily Jourdan Durham, DO   fluticasone-salmeterol 1 puff Inhalation Q12H [address] Jourdan Durham, DO   furosemide 20 mg Oral BID (diuretic) Jourdan Durham, DO   ondansetron 4 mg Intravenous Q6H PRN Jourdan Durham, DO   pantoprazole 40 mg Oral Early Morning Jourdan Durham, DO   potassium chloride 10 mEq Oral Daily Jourdan Durham, DO   sertraline 50 mg Oral Daily Jourdan Durham, DO       PRN Meds:     acetaminophen    albuterol    aluminum-magnesium hydroxide-simethicone    ondansetron

## 2018-06-29 NOTE — SOCIAL WORK
CM met with patient and explained cm role  Pt alert and oriented  Pts daughter Remedios Smith [phone] at bedside Pt reports she lives in [address] alone  Pt reports DME: [employer], reports 2 liters of Oxygen at home supplied by [employer]  CM informed daughter to bring travel O2 for d/c  denies VNA and rehab  Pt reports being independent PTA, reports good support from family and friends, she does not drive, and transport is provided by family to appointment and for d/c home  Pts pharmacy is [facility], declined meds to bed at this time  No POA  Pt denies hx/admission for drug/etoh and psych/mental health  CM reviewed d/c planning process including the following: identifying help at home, patient preference for d/c planning needs, Discharge Lounge, [facility] Meds to Bed program, availability of treatment team to discuss questions or concerns patient and/or family may have regarding understanding medications and recognizing signs and symptoms once discharged  CM also encouraged patient to follow up with all recommended appointments after discharge  Patient advised of importance for patient and family to participate in managing patients medical well being

## 2018-06-29 NOTE — DISCHARGE SUMMARY
[facility] Discharge Summary - Martin Hargrove 80 y o  female MRN: [id number]    [place] [address]      Room / Bed: /-01 Encounter: [id number]    BRIEF OVERVIEW    Admitting Provider: Paula Blair MD  Discharge Provider: Paula Blair MD  Primary Care Physician at Discharge: Yousif Parmar MD (Nov 09, 2016 - Present) [facility]    Discharge To: Home   Facility / Family Member Name:  Silas Gilliland (patient's daughter)      Admission Date: 6/28/2018     Discharge Date:   06/29/2018  Primary Discharge Diagnosis  Principal Problem:    Ambulatory dysfunction  Active Problems:    Diastolic CHF, chronic (HCC)    Essential hypertension    Chronic obstructive pulmonary disease (HCC)    Gastroesophageal reflux disease without esophagitis    Iron deficiency anemia    PVD (peripheral vascular disease) with claudication (HCC)    Chronic respiratory failure with hypoxia (HCC)    Vertigo  Resolved Problems:    * No resolved hospital problems  *      Other Problems Addressed:  None    Consulting Providers   None    Therapeutic Operative Procedures Performed  None    Diagnostic Procedures Performed    Results from last 7 days  Lab Units 06/29/18  0556 06/28/18  1208   SODIUM mmol/L 141 141   POTASSIUM mmol/L 3 3* 3 6   CHLORIDE mmol/L 105 105   CO2 mmol/L 27 28   BUN mg/dL 14 14   CREATININE mg/dL 0 68 0 70   CALCIUM mg/dL 8 6 9 3   TOTAL PROTEIN g/dL  --  7 1   BILIRUBIN TOTAL mg/dL  --  0 73   ALK PHOS U/L  --  72   ALT U/L  --  18   AST U/L  --  18   GLUCOSE RANDOM mg/dL 99 101     Xr Hand 3+ Views Left    Result Date: 6/28/2018  Impression: No acute osseous abnormality  Workstation performed: [id number]     Ct Head Without Contrast    Result Date: 6/28/2018  Impression: No acute intracranial process  No skull fracture  Chronic microangiopathy   Workstation performed: [id number]     Ct Cervical Spine Without Contrast    Result Date: 6/28/2018  Impression: No cervical spine fracture or traumatic malalignment  Moderate multilevel cervical degenerative changes  Workstation performed: [id number]         Discharge Disposition:  Home  Discharged With Lines: no    Test Results Pending at Discharge:  None    Outpatient Follow-Up  Follow-up with your PCP in 1-2 weeks at [facility] as above    Code Status: Level 3 - DNAR and DNI    Medications   Current Discharge Medication List      CONTINUE these medications which have NOT CHANGED    Details   albuterol (PROVENTIL HFA,VENTOLIN HFA) 90 mcg/act inhaler Inhale 2 puffs as needed for wheezing      amLODIPine (NORVASC) 5 mg tablet Take 5 mg by mouth daily      aspirin 81 mg chewable tablet Chew 81 mg daily      atorvastatin (LIPITOR) 10 mg tablet Take 10 mg by mouth daily      cholecalciferol (VITAMIN D3) 1,000 units tablet Take 1,000 Units by mouth daily      ferrous sulfate 325 (65 Fe) mg tablet Take 325 mg by mouth takes 2-3 times a week      fluticasone-salmeterol (ADVAIR) 250-50 mcg/dose inhaler Inhale 1 puff 2 (two) times a day Rinse mouth after use  furosemide (LASIX) 20 mg tablet Take 20 mg by mouth 2 (two) times a day as needed      omeprazole (PriLOSEC) 20 mg delayed release capsule Take 20 mg by mouth 2 (two) times a day      potassium chloride (K-DUR) 10 mEq tablet Take 10 mEq by mouth daily      sertraline (ZOLOFT) 50 mg tablet Take 50 mg by mouth daily                Allergies  No Known Allergies  Discharge Diet: cardiac diet  Activity restrictions: no sports and sudden movements of head    [facility] Course  51-year-old female a past medical history of chronic diastolic CHF with chronic respiratory failure with hypoxia requiring 2-3 L O2 nasal cannula who presents after a fall from home  Patient initially arrived as a trauma alert, had CTs head and neck and x-rays of the left wrist which were unremarkable    She was going to be discharged home from the emergency department however when she stood up from the bed she felt dizzy like the room was spinning  This lasted for a brief amount of time and she laid back down  Decision was made to admit her for observation overnight  Her hospital stay was largely unremarkable  No clinically significant laboratory abnormalities  No events on telemetry  Urine studies revealed asymptomatic sterile pyuria  The following day she was discharged home with her daughter to her senior living facility  She exhibited mild to moderate symptoms of dizziness with turning of head however this was brief and self resolving  She was able to ambulate to and from the bathroom on the medical floor without issue  Instructed her given not to go from lying to sitting or sitting to standing rapidly, use caution when walking, avoid sudden movements of head  She will follow up with PCP [facility]  Presenting Problem/History of Present Illness  Principal Problem:    Ambulatory dysfunction  Active Problems:    Diastolic CHF, chronic ([facility])    Essential hypertension    Chronic obstructive pulmonary disease ([facility])    Gastroesophageal reflux disease without esophagitis    Iron deficiency anemia    PVD (peripheral vascular disease) with claudication ([facility])    Chronic respiratory failure with hypoxia ([facility])    Vertigo  Resolved Problems:    * No resolved hospital problems  *    1   Ambulatory dysfunction  Likely secondary to vertigo, orthostatic vital signs negative  Resolved, pt was able to ambulate to and from bathroom on medical floor prior to discharge       2   Dizziness/vertigo suspect BPPV  Patient's dizziness is elicited by turning her head and lying on her left side, the area of injury  He also provoked during extraocular movements  Due to the extremely short duration of symptoms and quick resolution with resting the head unlikely any suspicion for dangerous etiology  Will ambulate patient see how she does today and then probable discharge  this afternoon    No events on telemetry       3   Chronic diastolic CHF  Continue home regimen of Lasix and blood pressure control  Potassium repletion given extra 40 mEq K-Dur tablet     4   Chronic respiratory failure with hypoxia on 2-3 L at baseline, stable  Continue 2 L while lying in bed in the hospital   No further intervention     5   COPD not in acute exacerbation  Continue home fluticasone, albuterol inhaler p r n      6   GERD  Protonix 40 mg while in the hospital     7   Peripheral vascular disease  Continue aspirin, s/p stent placement      8  iron deficiency anemia  H/H normal, hold iron tabs as only 2-3x weekly  F/u outpatient       Other Pertinent Test Results  None     Discharge Condition: stable      Discharge  Statement   I spent 20 minutes minutes discharging the patient  This time was spent on the day of discharge  I had direct contact with the patient on the day of discharge  Additional documentation is required if more than 30 minutes were spent on discharge

## 2018-06-29 NOTE — DISCHARGE INSTRUCTIONS
Your being discharged from the hospital   Your imaging scans, laboratory studies, urine studies, and cardiac monitoring were all without significant abnormality  Please follow-up with your PCP in 1-2 weeks  Continue all medication as previously prescribed  Avoid sudden head movements, go slow when moving from sitting to standing and use caution when walking  Benign Paroxysmal Positional Vertigo   WHAT YOU NEED TO KNOW:   BPPV is an inner ear condition that causes you to suddenly feel dizzy  Benign means it is not serious or life-threatening  BPPV is caused by a problem with the nerves and structure of your inner ear  BPPV happens when small pieces of calcium break loose and lump together in one of your inner ear canals  DISCHARGE INSTRUCTIONS:   Seek care immediately if:   · You fall during a BPPV episode and are injured  · You have a severe headache that does not go away  · You have new changes in your vision or feel weak or confused  · You have problems hearing, or you have ringing or buzzing in your ears  Contact your healthcare provider if:   · Your BPPV symptoms do not go away or they return  · You have problems with your balance, or you are falling often  · You have new or increased nausea or vomiting with vertigo  · You feel anxious or depressed and do not want to leave your home  · You have questions or concerns about your condition or care  Medicines:   · Medicines  may be recommended or prescribed to treat dizziness or nausea  · Take your medicine as directed  Contact your healthcare provider if you think your medicine is not helping or if you have side effects  Tell him of her if you are allergic to any medicine  Keep a list of the medicines, vitamins, and herbs you take  Include the amounts, and when and why you take them  Bring the list or the pill bottles to follow-up visits  Carry your medicine list with you in case of an emergency    Prevent your symptoms:   · Try to avoid sudden head movements  Stand up and lie down slowly  · Raise and support your head when you lie down  Place pillows under your upper back and head or rest in a recliner  · Change your position often when you are lying down  Try not to lie with your head on the same side for long periods of time  Roll over slowly  · Wear protective gear  when you ride a bike or play sports  A helmet helps protect your head from injury  Follow up with your healthcare provider as directed: You may need to return in 1 month to check the progress of your treatment  Write down your questions so you remember to ask them during your visits  © 2017 [facility] Information is for End User's use only and may not be sold, redistributed or otherwise used for commercial purposes  All illustrations and images included in CareNotes® are the copyrighted property of [employer]  or Dawson Macdonald  The above information is an  only  It is not intended as medical advice for individual conditions or treatments  Talk to your doctor, nurse or pharmacist before following any medical regimen to see if it is safe and effective for you

## 2018-07-13 NOTE — ED ATTENDING ATTESTATION
Irene Wilhelm MD, saw and evaluated the patient  I have discussed the patient with the resident/non-physician practitioner and agree with the resident's/non-physician practitioner's findings, Plan of Care, and MDM as documented in the resident's/non-physician practitioner's note, except where noted  All available labs and Radiology studies were reviewed  At this point I agree with the current assessment done in the Emergency Department  I have conducted an independent evaluation of this patient a history and physical is as follows:    Patient presents for evaluation after a fall at home  The fall was unwitnessed and occurred after patient attempted to stand up from the toilet  She states that she attempted to grab onto a walker but the break was not locked and she struck her face on the walker  Denies any loss of consciousness  Currently, reports some left eye pain  No additional complaints  Exam: AAOx3, NAD, RRR, CTA, S/NT/ND, no motor/sensory deficits, left eye ecchymosis  A/P:  Closed head injury, weakness  Will check labs, urine, and will CT head and C-spine      Critical Care Time  CritCare Time    Procedures

## 2022-01-01 ENCOUNTER — NURSING HOME VISIT (OUTPATIENT)
Dept: GERIATRICS | Facility: OTHER | Age: 87
End: 2022-01-01
Payer: MEDICARE

## 2022-01-01 ENCOUNTER — TELEPHONE (OUTPATIENT)
Dept: GERIATRICS | Age: 87
End: 2022-01-01

## 2022-01-01 ENCOUNTER — TELEPHONE (OUTPATIENT)
Dept: OTHER | Facility: OTHER | Age: 87
End: 2022-01-01

## 2022-01-01 VITALS
SYSTOLIC BLOOD PRESSURE: 121 MMHG | RESPIRATION RATE: 20 BRPM | HEART RATE: 58 BPM | TEMPERATURE: 98.2 F | WEIGHT: 119 LBS | DIASTOLIC BLOOD PRESSURE: 61 MMHG | BODY MASS INDEX: 24.87 KG/M2

## 2022-01-01 VITALS
BODY MASS INDEX: 24.58 KG/M2 | HEART RATE: 72 BPM | TEMPERATURE: 98.7 F | DIASTOLIC BLOOD PRESSURE: 59 MMHG | RESPIRATION RATE: 20 BRPM | WEIGHT: 117.6 LBS | SYSTOLIC BLOOD PRESSURE: 137 MMHG

## 2022-01-01 DIAGNOSIS — J43.1 PANLOBULAR EMPHYSEMA (HCC): ICD-10-CM

## 2022-01-01 DIAGNOSIS — Z66 DNR (DO NOT RESUSCITATE): ICD-10-CM

## 2022-01-01 DIAGNOSIS — I10 HYPERTENSION, UNSPECIFIED TYPE: ICD-10-CM

## 2022-01-01 DIAGNOSIS — K21.9 GASTROESOPHAGEAL REFLUX DISEASE WITHOUT ESOPHAGITIS: Primary | ICD-10-CM

## 2022-01-01 DIAGNOSIS — U07.1 COVID-19: Primary | ICD-10-CM

## 2022-01-01 DIAGNOSIS — I48.91 ATRIAL FIBRILLATION, UNSPECIFIED TYPE (HCC): ICD-10-CM

## 2022-01-01 DIAGNOSIS — R26.2 AMBULATORY DYSFUNCTION: ICD-10-CM

## 2022-01-01 DIAGNOSIS — U07.1 COVID-19: ICD-10-CM

## 2022-01-01 DIAGNOSIS — J96.21 ACUTE ON CHRONIC RESPIRATORY FAILURE WITH HYPOXIA (HCC): ICD-10-CM

## 2022-01-01 DIAGNOSIS — I50.33 ACUTE ON CHRONIC HEART FAILURE WITH PRESERVED EJECTION FRACTION (HCC): ICD-10-CM

## 2022-01-01 DIAGNOSIS — E55.9 VITAMIN D DEFICIENCY: ICD-10-CM

## 2022-01-01 PROCEDURE — 99316 NF DSCHRG MGMT 30 MIN+: CPT | Performed by: PHYSICIAN ASSISTANT

## 2022-01-01 PROCEDURE — 99309 SBSQ NF CARE MODERATE MDM 30: CPT | Performed by: PHYSICIAN ASSISTANT

## 2022-01-01 PROCEDURE — 99306 1ST NF CARE HIGH MDM 50: CPT | Performed by: INTERNAL MEDICINE

## 2022-01-01 RX ORDER — OMEPRAZOLE 40 MG/1
40 CAPSULE, DELAYED RELEASE ORAL DAILY
COMMUNITY
Start: 2022-01-01

## 2022-01-01 RX ORDER — FUROSEMIDE 40 MG/1
1 TABLET ORAL DAILY
COMMUNITY
Start: 2022-01-01

## 2022-01-26 NOTE — TELEPHONE ENCOUNTER
Patient was admitted needs orders         Best callback is [phone] station 1    Messaged on call provider

## 2022-01-27 NOTE — PROGRESS NOTES
[address]  [address]  Facility: [facility]  31    HISTORY AND PHYSICAL    NAME: Valdemar Whalen  AGE: 80 y o  SEX: female    DATE OF ENCOUNTER: 1/27/2022    Code status:  DNR    Assessment and Plan     1  COVID-19  Assessment & Plan:  · Hospitalized from January 14, 2022 through January 26, 2022 and treated with increased supplemental oxygen, dexamethasone, and remdesivir   · Her symptoms have improved with treatment during her hospitalization, but she has increased debility secondary to her acute illness  · She will be admitted to the rehabilitation facility and seen in consultation by a multidisciplinary rehabilitation team for evaluation and treatment to assist her in returning to her prior level functioning   · Will continue with monitoring for change in her condition  · She will follow-up with her PCP upon discharge      2  Acute on chronic respiratory failure with hypoxia [facility])  Assessment & Plan:  · Status post acute care hospitalization from January 14, 2022 to drainage 06/20/2022   · Secondary to COVID-19 infection and acute exacerbation of her heart failure  · Her symptoms have improved after her hospitalization, but she continues with debility secondary to her acute illness   · She will be admitted to the rehabilitation facility and seen in consultation by a multidisciplinary rehabilitation team for evaluation and treatment to assist her in returning to her prior level of functioning   · Will continue with monitoring for change in her condition   · She requires supplemental oxygen at 2-3 liters/minute at baseline  · She will follow-up with her PCP upon discharge      3  Panlobular emphysema ([address] )  Assessment & Plan:  · Will continue her prior to admission Advair 250/50 and supplemental oxygen   · Will continue with monitoring for change in her condition  · She will follow-up with her PCP upon discharge      4   Acute on chronic heart failure with preserved ejection fraction (HCC)  Assessment & Plan:  · She was treated with enter recent is furosemide during hospitalization   · She was returned to her oral furosemide at the time of discharge   · Will continue her prior to admission furosemide and potassium  · Will continue with clinical and periodic laboratory monitoring for change in her condition   · She will follow-up with her PCP and cardiology services upon discharge            5  Hypertension, unspecified type  Assessment & Plan:  · Will continue her prior to admission extended-release verapamil  · Will continue with monitoring for change in her condition   · She will follow-up with her PCP upon discharge      6  Atrial fibrillation, unspecified type ([address] )  Assessment & Plan:  · She is doing well with apixaban for anticoagulation  · She is doing well with extended-release verapamil for rate control   · She will continue on omeprazole for GI prophylaxis  · Will continue her on this medication regimen   · Will continue omeprazole for GI prophylaxis   · Will continue with monitoring for change in her condition  · She will follow-up with her PCP upon discharge      7  Vitamin D deficiency  Assessment & Plan:  · Will continue her prior to admission vitamin D3 supplement   · She will follow-up with her PCP upon discharge      8  DNR (do not resuscitate)      All medications and routine orders were reviewed and updated as needed  Plan discussed with:  Patient and nursing staff  Chief Complaint     She is seen for visit to perform a history and physical exam to be admitted to the rehabilitation facility  History of Present Illness     History is obtained from patient interview and review of outside electronic medical record  Visit was performed with the assistance of female nursing staff and use of a white board secondary to her extreme difficulty hearing        She is a pleasant 20-year-old woman with the comorbidities of chronic respiratory failure with hypoxia secondary to COPD, chronic heart failure with preserved ejection fraction, hypertension, atrial fibrillation, vitamin-D deficiency, and extreme difficulty hearing who is seen with female nursing staff for a visit to perform a history and physical exam to be admitted to the rehabilitation facility  She presented to the emergency room on January 14, 2022 with complaint of shortness of breath  Evaluation in the emergency room revealed her to be COVID-19 positive  She was admitted to the [facility] hospital from January 14, 2022 through January 26, 2022  She was seen in consultation by the Infectious Disease and Pulmonary Services  Secondary to her increased need for supplemental oxygen and findings on imaging, she was treated with dexamethasone, remdesivir, and supplemental oxygen up to 15 liters/minute (records show baseline oxygen requirement of 2-3 liters/minute)  She was treated with empiric antibiotics  She was given intravenous furosemide  Therapy recommended a post acute care rehabilitation stay prior to returning home  She was transferred to the rehabilitation facility on January 26, 2022  HISTORY:  Past Medical History:   Diagnosis Date    CHF (congestive heart failure) (HCC)     GERD (gastroesophageal reflux disease)     Hyperlipidemia     Hypertension      No past surgical history on file  Family History   Problem Relation Age of Onset    Heart attack Mother     Diabetes Mother     Stroke Father      Social History     Socioeconomic History    Marital status:       Spouse name: Not on file    Number of children: Not on file    Years of education: Not on file    Highest education level: Not on file   Occupational History    Not on file   Tobacco Use    Smoking status: Former Smoker    Smokeless tobacco: Never Used   Substance and Sexual Activity    Alcohol use: No    Drug use: No    Sexual activity: Not on file   Other Topics Concern    Not on file   Social History Narrative    Not on file     Social Determinants of Health     Financial Resource Strain: Not on file   Food Insecurity: Not on file   Transportation Needs: Not on file   Physical Activity: Not on file   Stress: Not on file   Social Connections: Not on file   Intimate Partner Violence: Not on file   Housing Stability: Not on file       Allergies:  No Known Allergies    Review of Systems     Review of Systems   Constitutional: Negative for appetite change and fever  She snacks a lot at home without gaining weight  HENT: Positive for hearing loss  Negative for dental problem and trouble swallowing  Full dentures   Eyes: Positive for visual disturbance  Respiratory: Negative for shortness of breath  Cardiovascular: Negative for chest pain  Gastrointestinal: Negative for constipation  Genitourinary: Negative for dysuria  Musculoskeletal: Negative for arthralgias  Neurological: Negative for headaches  Psychiatric/Behavioral: Negative for dysphoric mood and sleep disturbance  The patient is not nervous/anxious  Medications and orders     All medications reviewed and updated in Nursing Home EMR  Objective     Vitals:  Weight 117 4 lb, temperature 98 5° F, pulse 53, blood pressure 172/78, pulse oximetry 93% on room air  Physical Exam  Vitals reviewed  Exam conducted with a chaperone present  Constitutional:       General: She is awake  She is not in acute distress  Appearance: She is well-developed  She is not ill-appearing, toxic-appearing or diaphoretic  Comments: Appears comfortable, stated age, and frail  HENT:      Nose: Nose normal       Mouth/Throat:      Mouth: Mucous membranes are dry  Eyes:      General: No scleral icterus  Conjunctiva/sclera: Conjunctivae normal    Cardiovascular:      Rate and Rhythm: Normal rate and regular rhythm  Heart sounds: Normal heart sounds  No murmur heard  No friction rub  No gallop  Comments: There is no pretibial edema, bilaterally  Pulmonary:      Effort: Pulmonary effort is normal  No respiratory distress  Breath sounds: Normal breath sounds  No stridor  No wheezing, rhonchi or rales  Abdominal:      General: Abdomen is flat  There is no distension  Palpations: Abdomen is soft  There is no mass  Tenderness: There is no abdominal tenderness  There is no guarding or rebound  Musculoskeletal:         General: No deformity  Cervical back: Neck supple  Skin:     Findings: No rash  Neurological:      Mental Status: She is alert and oriented to person, place, and time  Psychiatric:         Mood and Affect: Mood normal          Behavior: Behavior normal  Behavior is cooperative  Pertinent Laboratory/Diagnostic Studies: The following labs/studies were reviewed please see chart or hospital paperwork for details  January 18, 2022:     CBC with diff:  WBC count 7 8, hemoglobin 11 2, hematocrit 35 2, platelet count [id number]     January 21, 2022: BMP: Sodium 138, potassium 4, BUN 27, creatinine 0 96, random blood sugar 174, EGFR 50    January 18, 2022:     Portable chest x-ray:     FINDINGS:  Heart is enlarged  There is mild pulmonary congestion    There is coarsening of  the lung markings  There is mild blunting of the costophrenic angles  Bony  thorax is intact  IMPRESSION:  IMPRESSION:  There is probably some mild heart failure  Small pleural effusions  No change  compared to recent exam     January 15, 2022:     2D transthoracic echocardiogram:       Left Ventricle: Left ventricle is normal in size  Wall thickness is   normal  Systolic function is normal with an ejection fraction of 60-65%  Wall motion is within normal limits  There is grade II (moderate)   diastolic dysfunction and elevated left atrial pressure    Left Atrium: Left atrium cavity is moderately dilated      Right Ventricle: Systolic function is normal     Right Atrium: Right atrium cavity is moderately dilated    Aortic Valve: The aortic valve was not well visualized  There is mild   regurgitation    IVC/SVC: The inferior vena cava demonstrates a diameter of <=21 mm and   collapses >50%; therefore, the right atrial pressure is estimated at 0-5   mmHg    Pulmonic Valve: The pulmonic valve was not well visualized  The   estimated pulmonary artery systolic pressure is 68 5 mmHg  There is mild   pulmonary hypertension  January 19, 2022:     Twelve lead ECG:     Impression SINUS TACHYCARDIA  ATRIAL TACHYCARDIA  RIGHT BUNDLE BRANCH BLOCK  ABNORMAL ECG  WHEN COMPARED WITH ECG OF 18-JAN-2022 02:18,  PREVIOUS ECG HAS UNDETERMINED RHYTHM, NEEDS REVIEW  Confirmed by Larey Angelucci MD, [address] [place] [address] ([phone]) on 1/20/2022 9:26:50 AM     - Her admission order summary was reviewed and signed      JAYNA Reinoso   1/30/2022 5:40 PM

## 2022-01-30 PROBLEM — I48.91 ATRIAL FIBRILLATION (HCC): Status: ACTIVE | Noted: 2022-01-01

## 2022-01-30 PROBLEM — I50.33 ACUTE ON CHRONIC HEART FAILURE WITH PRESERVED EJECTION FRACTION (HCC): Status: ACTIVE | Noted: 2017-12-18

## 2022-01-30 PROBLEM — J96.21 ACUTE ON CHRONIC RESPIRATORY FAILURE WITH HYPOXIA (HCC): Status: ACTIVE | Noted: 2018-06-28

## 2022-01-30 PROBLEM — U07.1 COVID-19: Status: ACTIVE | Noted: 2022-01-01

## 2022-01-30 PROBLEM — U07.1 PNEUMONIA DUE TO COVID-19 VIRUS: Status: ACTIVE | Noted: 2022-01-01

## 2022-01-30 PROBLEM — I45.10 INCOMPLETE RIGHT BUNDLE BRANCH BLOCK: Status: ACTIVE | Noted: 2022-01-01

## 2022-01-30 PROBLEM — Z66 DNR (DO NOT RESUSCITATE): Status: ACTIVE | Noted: 2022-01-01

## 2022-01-30 PROBLEM — J12.82 PNEUMONIA DUE TO COVID-19 VIRUS: Status: ACTIVE | Noted: 2022-01-01

## 2022-01-30 NOTE — ASSESSMENT & PLAN NOTE
· Status post acute care hospitalization from January 14, 2022 to drainage 06/20/2022   · Secondary to COVID-19 infection and acute exacerbation of her heart failure  · Her symptoms have improved after her hospitalization, but she continues with debility secondary to her acute illness   · She will be admitted to the rehabilitation facility and seen in consultation by a multidisciplinary rehabilitation team for evaluation and treatment to assist her in returning to her prior level of functioning   · Will continue with monitoring for change in her condition   · She requires supplemental oxygen at 2-3 liters/minute at baseline  · She will follow-up with her PCP upon discharge

## 2022-01-30 NOTE — ASSESSMENT & PLAN NOTE
· Hospitalized from January 14, 2022 through January 26, 2022 and treated with increased supplemental oxygen, dexamethasone, and remdesivir   · Her symptoms have improved with treatment during her hospitalization, but she has increased debility secondary to her acute illness  · She will be admitted to the rehabilitation facility and seen in consultation by a multidisciplinary rehabilitation team for evaluation and treatment to assist her in returning to her prior level functioning   · Will continue with monitoring for change in her condition  · She will follow-up with her PCP upon discharge

## 2022-01-30 NOTE — ASSESSMENT & PLAN NOTE
· Will continue her prior to admission vitamin D3 supplement   · She will follow-up with her PCP upon discharge

## 2022-01-30 NOTE — ASSESSMENT & PLAN NOTE
· She is doing well with apixaban for anticoagulation  · She is doing well with extended-release verapamil for rate control   · She will continue on omeprazole for GI prophylaxis  · Will continue her on this medication regimen   · Will continue omeprazole for GI prophylaxis   · Will continue with monitoring for change in her condition  · She will follow-up with her PCP upon discharge

## 2022-01-30 NOTE — ASSESSMENT & PLAN NOTE
· Will continue her prior to admission extended-release verapamil  · Will continue with monitoring for change in her condition   · She will follow-up with her PCP upon discharge

## 2022-01-30 NOTE — ASSESSMENT & PLAN NOTE
· Will continue her prior to admission Advair 250/50 and supplemental oxygen   · Will continue with monitoring for change in her condition  · She will follow-up with her PCP upon discharge

## 2022-01-30 NOTE — ASSESSMENT & PLAN NOTE
· She was treated with enter recent is furosemide during hospitalization   · She was returned to her oral furosemide at the time of discharge   · Will continue her prior to admission furosemide and potassium  · Will continue with clinical and periodic laboratory monitoring for change in her condition   · She will follow-up with her PCP and cardiology services upon discharge

## 2022-02-02 NOTE — ASSESSMENT & PLAN NOTE
Wt Readings from Last 3 Encounters:   02/02/22 53 3 kg (117 lb 9 6 oz)   06/28/18 62 4 kg (137 lb 8 oz)   06/09/16 63 6 kg (140 lb 4 oz)         Continue lasix  Monitor weight

## 2022-02-02 NOTE — PROGRESS NOTES
[facility]  [address]  [phone] [address]  Code 31       NAME: Curtis Gee  AGE: 80 y o  SEX: female [id number]    DATE OF ENCOUNTER: 2/2/2022    CODE STATUS: DNR    Assessment and Plan     Problem List Items Addressed This Visit        Digestive    Gastroesophageal reflux disease without esophagitis - Primary     Continue PPI            Respiratory    Chronic obstructive pulmonary disease (HCC)     At baseline  Continue supplemental oxygen  Continue albuterol, advair            Cardiovascular and Mediastinum    Acute on chronic heart failure with preserved ejection fraction (HCC)     Wt Readings from Last 3 Encounters:   02/02/22 53 3 kg (117 lb 9 6 oz)   06/28/18 62 4 kg (137 lb 8 oz)   06/09/16 63 6 kg (140 lb 4 oz)         Continue lasix  Monitor weight         Atrial fibrillation (HCC)     Rate controlled  Continue eliquis               No orders of the defined types were placed in this encounter  Chief Complaint     Chief Complaint   Patient presents with    Geriatric Evaluation     follow up       History of Present Illness   80year old female being seen today in collaboration with nursing for follow up for chronic conditions  Patient was admitted to [address] [place] [address] for short term rehab after a recent hospitalization  She is doing well with rehab and is planning for discharge back home next week  She is very hard of hearing and communicates by reading off a white board  The following portions of the patient's history were reviewed and updated as appropriate: allergies, current medications, past family history, past medical history, past social history, past surgical history and problem list     Review of Systems   Review of Systems   Constitutional: Negative  HENT: Negative  Eyes: Negative  Respiratory: Positive for shortness of breath  Cardiovascular: Negative      Gastrointestinal: Negative  Endocrine: Negative  Genitourinary: Negative  Musculoskeletal: Positive for gait problem  Skin: Negative  Allergic/Immunologic: Negative  Hematological: Negative  Psychiatric/Behavioral: Negative  Active Problem List     Patient Active Problem List   Diagnosis    Ambulatory dysfunction    Acute on chronic heart failure with preserved ejection fraction (HCC)    Hypertension    Chronic obstructive pulmonary disease (HCC)    Gastroesophageal reflux disease without esophagitis    Iron deficiency anemia    PVD (peripheral vascular disease) with claudication ([address] )    Vitamin D deficiency    [facility] on [facility]    Vertigo    DNR (do not resuscitate)    Incomplete right bundle branch block    Atrial fibrillation (HCC)    COVID-19         Objective     /59   Pulse 72   Temp 98 7 °F (37 1 °C)   Resp 20   Wt 53 3 kg (117 lb 9 6 oz)   BMI 24 58 kg/m²     Physical Exam  Vitals reviewed  Constitutional:       General: She is not in acute distress  Appearance: She is not ill-appearing or diaphoretic  HENT:      Head: Normocephalic and atraumatic  Nose: Nose normal  No congestion  Mouth/Throat:      Mouth: Mucous membranes are moist       Pharynx: Oropharynx is clear  Cardiovascular:      Rate and Rhythm: Normal rate  Pulmonary:      Effort: Pulmonary effort is normal  No respiratory distress  Comments: Decreased breath sounds bilaterally  Abdominal:      General: Abdomen is flat  Bowel sounds are normal  There is no distension  Palpations: Abdomen is soft  Tenderness: There is no abdominal tenderness  Musculoskeletal:         General: No tenderness, deformity or signs of injury  Cervical back: Neck supple  Lymphadenopathy:      Cervical: No cervical adenopathy  Skin:     General: Skin is warm and dry  Findings: No bruising or erythema     Neurological:      Mental Status: She is alert  Mental status is at baseline  Psychiatric:         Mood and Affect: Mood normal          Behavior: Behavior normal          Pertinent Laboratory/Diagnostic Studies:    reviewed    Current Medications   Medications reviewed and updated in facility chart

## 2022-02-08 NOTE — ASSESSMENT & PLAN NOTE
Continue supplemental oxygen  Continue inhalers  Breathing back to baseline  Follow with PCP upon discharge

## 2022-02-08 NOTE — ASSESSMENT & PLAN NOTE
Wt Readings from Last 3 Encounters:   02/08/22 54 kg (119 lb)   02/02/22 53 3 kg (117 lb 9 6 oz)   06/28/18 62 4 kg (137 lb 8 oz)         Weight stable  Euvolemic on exam  Continue current dose lasix  Follow with PCP upon discharge from rehab

## 2022-02-08 NOTE — PROGRESS NOTES
[facility]  [address]  [phone]  [address]  Code 31  DISCHARGE NOTE      NAME: Stephanie Car  AGE: 80 y o  SEX: female [id number]    DATE OF ENCOUNTER: 2/8/2022    CODE STATUS: DNR    Assessment and Plan     Problem List Items Addressed This Visit        Digestive    Gastroesophageal reflux disease without esophagitis - Primary     Continue PPI            Respiratory    Chronic obstructive pulmonary disease (HCC)     Continue supplemental oxygen  Continue inhalers  Breathing back to baseline  Follow with PCP upon discharge            Cardiovascular and Mediastinum    Acute on chronic heart failure with preserved ejection fraction (HCC)     Wt Readings from Last 3 Encounters:   02/08/22 54 kg (119 lb)   02/02/22 53 3 kg (117 lb 9 6 oz)   06/28/18 62 4 kg (137 lb 8 oz)         Weight stable  Euvolemic on exam  Continue current dose lasix  Follow with PCP upon discharge from rehab         Atrial fibrillation (HCC)     Rate stable  Continue eliquis            Other    Ambulatory dysfunction     Recent dx of T12 compression fx, incidental finding on CT in hospital  Pain controlled         COVID-19     Hospitalized 1/14-1/26  Admitted for short term rehab after hospitalization  Has done well with therapy and is now discharging home with services    The following services will be medically necessary at the time of discharge:  1- PT for gait and balance  2- OT for self care and ADL's  3- nursing for medication teaching and fluid restriction               No orders of the defined types were placed in this encounter  Chief Complaint     Chief Complaint   Patient presents with    Geriatric Evaluation     discharge note       History of Present Illness   80year old female being seen today in collaboration with nursing for follow up for chronic conditions prior to her discharge home from short term rehab   Patient was admitted to [facility] rehab after a hospitalization for covid pneumonia on 1/14/22  She has done well with therapy and feels well  Nursing has no concerns  The following portions of the patient's history were reviewed and updated as appropriate: allergies, current medications, past family history, past medical history, past social history, past surgical history and problem list     Review of Systems   Review of Systems   Constitutional: Negative  HENT: Positive for hearing loss  Eyes: Negative  Respiratory: Positive for shortness of breath  Cardiovascular: Negative  Gastrointestinal: Negative  Endocrine: Negative  Genitourinary: Negative  Musculoskeletal: Positive for gait problem  Allergic/Immunologic: Negative  Hematological: Negative  Psychiatric/Behavioral: Negative  Active Problem List     Patient Active Problem List   Diagnosis    Ambulatory dysfunction    Acute on chronic heart failure with preserved ejection fraction (HCC)    Hypertension    Chronic obstructive pulmonary disease (HCC)    Gastroesophageal reflux disease without esophagitis    Iron deficiency anemia    PVD (peripheral vascular disease) with claudication ([address] )    Vitamin D deficiency    Acute on chronic respiratory failure with hypoxia ([address] )    Vertigo    DNR (do not resuscitate)    Incomplete right bundle branch block    Atrial fibrillation (HCC)    COVID-19         Objective     /61   Pulse 58   Temp 98 2 °F (36 8 °C)   Resp 20   Wt 54 kg (119 lb)   BMI 24 87 kg/m²     Physical Exam  Vitals reviewed  Constitutional:       General: She is not in acute distress  Appearance: She is not ill-appearing or diaphoretic  HENT:      Head: Normocephalic and atraumatic  Nose: Nose normal  No congestion  Mouth/Throat:      Mouth: Mucous membranes are moist       Pharynx: Oropharynx is clear     Eyes:      Conjunctiva/sclera: Conjunctivae normal       Pupils: Pupils are equal, round, and reactive to light  Cardiovascular:      Rate and Rhythm: Normal rate  Pulses: Normal pulses  Pulmonary:      Effort: Pulmonary effort is normal  No respiratory distress  Breath sounds: Normal breath sounds  No wheezing  Abdominal:      General: Abdomen is flat  Bowel sounds are normal  There is no distension  Palpations: Abdomen is soft  Tenderness: There is no abdominal tenderness  Musculoskeletal:      Cervical back: Neck supple  Lymphadenopathy:      Cervical: No cervical adenopathy  Skin:     General: Skin is warm and dry  Findings: No bruising or erythema  Neurological:      General: No focal deficit present  Mental Status: She is alert and oriented to person, place, and time  Psychiatric:         Mood and Affect: Mood normal          Behavior: Behavior normal          Pertinent Laboratory/Diagnostic Studies:      Current Medications   Medications reviewed and updated in facility chart  I have spent 45 minutes with Patient and family today in which greater than 50% of this time was spent in counseling/coordination of care regarding Risks and benefits of tx options and Patient and family education

## 2022-02-08 NOTE — ASSESSMENT & PLAN NOTE
Hospitalized 1/14-1/26  Admitted for short term rehab after hospitalization  Has done well with therapy and is now discharging home with services    The following services will be medically necessary at the time of discharge:  1- PT for gait and balance  2- OT for self care and ADL's  3- nursing for medication teaching and fluid restriction